# Patient Record
Sex: FEMALE | Race: WHITE | Employment: OTHER | URBAN - METROPOLITAN AREA
[De-identification: names, ages, dates, MRNs, and addresses within clinical notes are randomized per-mention and may not be internally consistent; named-entity substitution may affect disease eponyms.]

---

## 2019-07-02 ENCOUNTER — OFFICE VISIT (OUTPATIENT)
Dept: INTERNAL MEDICINE CLINIC | Age: 84
End: 2019-07-02
Payer: MEDICARE

## 2019-07-02 VITALS
OXYGEN SATURATION: 100 % | BODY MASS INDEX: 22.08 KG/M2 | DIASTOLIC BLOOD PRESSURE: 70 MMHG | TEMPERATURE: 97 F | SYSTOLIC BLOOD PRESSURE: 132 MMHG | WEIGHT: 120 LBS | HEIGHT: 62 IN | HEART RATE: 77 BPM

## 2019-07-02 DIAGNOSIS — E03.9 ACQUIRED HYPOTHYROIDISM: ICD-10-CM

## 2019-07-02 DIAGNOSIS — R53.81 DEBILITY: ICD-10-CM

## 2019-07-02 DIAGNOSIS — M40.04 POSTURAL KYPHOSIS OF THORACIC REGION: ICD-10-CM

## 2019-07-02 DIAGNOSIS — E78.00 HYPERCHOLESTEROLEMIA: ICD-10-CM

## 2019-07-02 DIAGNOSIS — I10 ESSENTIAL HYPERTENSION: ICD-10-CM

## 2019-07-02 DIAGNOSIS — H81.10 BENIGN PAROXYSMAL POSITIONAL VERTIGO, UNSPECIFIED LATERALITY: ICD-10-CM

## 2019-07-02 DIAGNOSIS — R54 FRAILTY SYNDROME IN GERIATRIC PATIENT: ICD-10-CM

## 2019-07-02 DIAGNOSIS — E55.9 VITAMIN D DEFICIENCY: Primary | ICD-10-CM

## 2019-07-02 DIAGNOSIS — N39.0 FREQUENT UTI: ICD-10-CM

## 2019-07-02 DIAGNOSIS — N18.2 CHRONIC KIDNEY DISEASE, STAGE II (MILD): ICD-10-CM

## 2019-07-02 PROBLEM — R55 VASOVAGAL SYNCOPE: Status: ACTIVE | Noted: 2017-02-08

## 2019-07-02 PROCEDURE — 99204 OFFICE O/P NEW MOD 45 MIN: CPT | Performed by: INTERNAL MEDICINE

## 2019-07-02 PROCEDURE — 1090F PRES/ABSN URINE INCON ASSESS: CPT | Performed by: INTERNAL MEDICINE

## 2019-07-02 PROCEDURE — 1036F TOBACCO NON-USER: CPT | Performed by: INTERNAL MEDICINE

## 2019-07-02 PROCEDURE — G8599 NO ASA/ANTIPLAT THER USE RNG: HCPCS | Performed by: INTERNAL MEDICINE

## 2019-07-02 PROCEDURE — 4040F PNEUMOC VAC/ADMIN/RCVD: CPT | Performed by: INTERNAL MEDICINE

## 2019-07-02 PROCEDURE — 1123F ACP DISCUSS/DSCN MKR DOCD: CPT | Performed by: INTERNAL MEDICINE

## 2019-07-02 PROCEDURE — G8427 DOCREV CUR MEDS BY ELIG CLIN: HCPCS | Performed by: INTERNAL MEDICINE

## 2019-07-02 PROCEDURE — G8420 CALC BMI NORM PARAMETERS: HCPCS | Performed by: INTERNAL MEDICINE

## 2019-07-02 RX ORDER — AMLODIPINE BESYLATE 5 MG/1
TABLET ORAL
COMMUNITY
Start: 2019-04-24 | End: 2020-01-03 | Stop reason: SDUPTHER

## 2019-07-02 RX ORDER — LEVOTHYROXINE SODIUM 0.03 MG/1
TABLET ORAL
COMMUNITY
Start: 2019-06-10 | End: 2020-04-07

## 2019-07-02 RX ORDER — MIRABEGRON 50 MG/1
TABLET, FILM COATED, EXTENDED RELEASE ORAL
COMMUNITY
Start: 2019-07-01 | End: 2019-07-02

## 2019-07-02 RX ORDER — MIRABEGRON 50 MG/1
50 TABLET, FILM COATED, EXTENDED RELEASE ORAL DAILY
Qty: 30 TABLET | Refills: 5 | Status: SHIPPED | OUTPATIENT
Start: 2019-07-02 | End: 2020-01-09

## 2019-07-02 RX ORDER — MECLIZINE HYDROCHLORIDE 25 MG/1
25 TABLET ORAL 3 TIMES DAILY PRN
Qty: 30 TABLET | Refills: 3 | Status: SHIPPED | OUTPATIENT
Start: 2019-07-02 | End: 2019-07-12

## 2019-07-02 ASSESSMENT — ENCOUNTER SYMPTOMS
RECTAL PAIN: 0
COLOR CHANGE: 0
PHOTOPHOBIA: 0
CHOKING: 0
APNEA: 0
RESPIRATORY NEGATIVE: 1
SHORTNESS OF BREATH: 0
FACIAL SWELLING: 0
TROUBLE SWALLOWING: 0
EYE ITCHING: 0
COUGH: 0
CHEST TIGHTNESS: 0
ANAL BLEEDING: 0
VOICE CHANGE: 0
BLOOD IN STOOL: 0
CONSTIPATION: 1
STRIDOR: 0
ABDOMINAL DISTENTION: 0
WHEEZING: 0
EYE REDNESS: 0
DIARRHEA: 1
ALLERGIC/IMMUNOLOGIC NEGATIVE: 1
ABDOMINAL PAIN: 1
EYES NEGATIVE: 1
RHINORRHEA: 0
SORE THROAT: 0
EYE PAIN: 0
BACK PAIN: 0
NAUSEA: 0
EYE DISCHARGE: 0
SINUS PRESSURE: 0
VOMITING: 0
SINUS PAIN: 0

## 2019-07-02 ASSESSMENT — PATIENT HEALTH QUESTIONNAIRE - PHQ9
SUM OF ALL RESPONSES TO PHQ QUESTIONS 1-9: 0
SUM OF ALL RESPONSES TO PHQ QUESTIONS 1-9: 0
1. LITTLE INTEREST OR PLEASURE IN DOING THINGS: 0
SUM OF ALL RESPONSES TO PHQ9 QUESTIONS 1 & 2: 0
2. FEELING DOWN, DEPRESSED OR HOPELESS: 0

## 2019-07-02 NOTE — PROGRESS NOTES
Psychiatric/Behavioral: Positive for sleep disturbance (insomnia). Negative for agitation, behavioral problems, confusion, decreased concentration, dysphoric mood, hallucinations, self-injury and suicidal ideas. The patient is nervous/anxious. The patient is not hyperactive.          Depression  Memory loss

## 2019-07-02 NOTE — PROGRESS NOTES
Chief Complaint   Patient presents with   [address]     NP - Fatigue - front of thighs hurt when standing up       HPI: Here to establish new PCP,  After Dr Hannah Velasquez retired then has been seeing visiting doctor at [facility], and for htn followup and management of multiple chronic conditions as per the active problems list below, which I reviewed and updated with the patient today. States doing well with no new concerns except if noted below. Lives at [facility] and saw Dr Lucas Haley there, but she stopped coming. I have reviewed the chart notes available from myself and other providers. I have addressed all activeproblems listed below, and created or updated the problems list as needed. I have reviewed and updated the problems list in detail with patient. Patient Active Problem List   Diagnosis    Benign paroxysmal positional vertigo    Chronic kidney disease, stage II (mild)    Coronary atherosclerosis    Hypercholesterolemia    Hypertension    Hypothyroid    Insomnia    Vasovagal syncope    Vitamin D deficiency    Old myocardial infarction    Depression    Frailty syndrome in geriatric patient    Debility    Frequent UTI       No problem-specific Assessment & Plan notes found for this encounter.       Discussed all labs, other tests, and imaging if available   Lab Results   Component Value Date    WBC 5.3 10/06/2016    HGB 14.1 10/06/2016    HCT 42.8 10/06/2016    MCV 86.8 10/06/2016     (L) 10/06/2016    LYMPHOPCT 23.4 10/06/2016    RBC 4.93 10/06/2016    MCH 28.7 10/06/2016    MCHC 33.0 10/06/2016    RDW 14.0 10/06/2016     Lab Results   Component Value Date     (L) 10/06/2016    K 4.2 10/06/2016    CL 95 (L) 10/06/2016    CO2 24 10/06/2016    BUN 15 10/06/2016    CREATININE 0.6 10/06/2016    GLUCOSE 119 (H) 10/06/2016    CALCIUM 9.0 10/06/2016    PROT 6.9 10/06/2016    LABALBU 4.2 10/06/2016    BILITOT 0.5 10/06/2016    ALKPHOS 81 10/06/2016    AST 24 Not on file   Relationships    Social connections:     Talks on phone: Not on file     Gets together: Not on file     Attends [employer] service: Not on file     Active member of club or organization: Not on file     Attends meetings of clubs or organizations: Not on file     Relationship status: Not on file    Intimate partner violence:     Fear of current or ex partner: Not on file     Emotionally abused: Not on file     Physically abused: Not on file     Forced sexual activity: Not on file   Other Topics Concern    Not on file   Social History Narrative    Not on file       History reviewed. No pertinent family history. Health Maintenance Due   Topic Date Due    TSH testing  03/18/1931    Shingles Vaccine (1 of 2) 03/18/1981    Annual Wellness Visit (AWV)  03/18/1994    Pneumococcal 65+ years Vaccine (2 of 2 - PCV13) 04/10/2015         /70 (Site: Left Upper Arm, Position: Sitting, Cuff Size: Medium Adult)   Pulse 77   Temp 97 °F (36.1 °C) (Oral)   Ht 5' 2\" (1.575 m)   Wt 120 lb (54.4 kg) Comment: Unable to stand on the scale  SpO2 100%   BMI 21.95 kg/m²   Body mass index is 21.95 kg/m². Physical Exam   Constitutional: She is oriented to person, place, and time. No distress. Frail, moves cautiously   HENT:   Head: Normocephalic and atraumatic. Nose: Nose normal.   Mouth/Throat: No oropharyngeal exudate. Eyes: Pupils are equal, round, and reactive to light. Conjunctivae and EOM are normal. Right eye exhibits no discharge. Left eye exhibits no discharge. No scleral icterus. Neck: Neck supple. No JVD present. No tracheal deviation present. No thyromegaly present. Cardiovascular: Normal rate, regular rhythm, normal heart sounds and intact distal pulses. Exam reveals no gallop and no friction rub. No murmur heard. Pulmonary/Chest: Effort normal and breath sounds normal. No respiratory distress. She has no wheezes. She has no rales. She exhibits no tenderness. Abdominal: Soft. Future    7. Chronic kidney disease, stage II (mild)  - COMPREHENSIVE METABOLIC PANEL; Future    8. Benign paroxysmal positional vertigo, unspecified laterality    9. Essential hypertension    10. Postural kyphosis of thoracic region  - DEXA BONE DENSITY 2 SITES; Future            Problem List     Benign paroxysmal positional vertigo    Chronic kidney disease, stage II (mild)    Relevant Orders    COMPREHENSIVE METABOLIC PANEL    Hypercholesterolemia    Relevant Medications    amLODIPine (NORVASC) 5 MG tablet    Other Relevant Orders    LIPID PANEL    Hypertension    Relevant Medications    amLODIPine (NORVASC) 5 MG tablet    Hypothyroid    Relevant Medications    levothyroxine (SYNTHROID) 25 MCG tablet    Other Relevant Orders    TSH WITH REFLEX TO FT4    Vitamin D deficiency - Primary    Relevant Orders    VITAMIN D 25 HYDROXY    Frailty syndrome in geriatric patient    Relevant Orders    DEXA BONE DENSITY 2 SITES    Debility    Relevant Orders    CBC WITH AUTO DIFFERENTIAL    DEXA BONE DENSITY 2 SITES    Frequent UTI        Orders Placed This Encounter   Procedures    DEXA BONE DENSITY 2 SITES     Standing Status:   Future     Standing Expiration Date:   7/2/2020    TSH WITH REFLEX TO FT4     Standing Status:   Future     Standing Expiration Date:   7/2/2020    VITAMIN D 25 HYDROXY     Standing Status:   Future     Standing Expiration Date:   7/2/2020    CBC WITH AUTO DIFFERENTIAL     Standing Status:   Future     Standing Expiration Date:   7/2/2020    COMPREHENSIVE METABOLIC PANEL     Standing Status:   Future     Standing Expiration Date:   7/2/2020    LIPID PANEL     Standing Status:   Future     Standing Expiration Date:   7/2/2020     Order Specific Question:   Is Patient Fasting?/# of Hours     Answer:   no       I have reconciled the medications in chart with what patient reports to be taking, andreviewed action/ sideeffects and how to take any new medications.   Patient/caregiver understands purpose and side effects. A complete  list of medications was provided in their after-visit summary. Return in about 6 months (around 1/2/2020) for Medicare Annual Wellness Visit (AWV), f/u [facility] extended. Time basedbilling: I spent over 45 minutes with this patient, and as is the nature of primary care and typical for my extended visits, over 50 percent of this visit was spent on counseling and coordination ofcare.

## 2019-08-14 ENCOUNTER — OFFICE VISIT (OUTPATIENT)
Dept: INTERNAL MEDICINE CLINIC | Age: 84
End: 2019-08-14
Payer: MEDICARE

## 2019-08-14 VITALS
OXYGEN SATURATION: 98 % | HEART RATE: 84 BPM | RESPIRATION RATE: 18 BRPM | BODY MASS INDEX: 21.95 KG/M2 | SYSTOLIC BLOOD PRESSURE: 123 MMHG | WEIGHT: 120 LBS | DIASTOLIC BLOOD PRESSURE: 72 MMHG

## 2019-08-14 DIAGNOSIS — R39.89 SUSPECTED UTI: Primary | ICD-10-CM

## 2019-08-14 PROCEDURE — G8427 DOCREV CUR MEDS BY ELIG CLIN: HCPCS | Performed by: NURSE PRACTITIONER

## 2019-08-14 PROCEDURE — 1036F TOBACCO NON-USER: CPT | Performed by: NURSE PRACTITIONER

## 2019-08-14 PROCEDURE — 1090F PRES/ABSN URINE INCON ASSESS: CPT | Performed by: NURSE PRACTITIONER

## 2019-08-14 PROCEDURE — 99213 OFFICE O/P EST LOW 20 MIN: CPT | Performed by: NURSE PRACTITIONER

## 2019-08-14 PROCEDURE — G8420 CALC BMI NORM PARAMETERS: HCPCS | Performed by: NURSE PRACTITIONER

## 2019-08-14 PROCEDURE — G8599 NO ASA/ANTIPLAT THER USE RNG: HCPCS | Performed by: NURSE PRACTITIONER

## 2019-08-14 PROCEDURE — 4040F PNEUMOC VAC/ADMIN/RCVD: CPT | Performed by: NURSE PRACTITIONER

## 2019-08-14 PROCEDURE — 1123F ACP DISCUSS/DSCN MKR DOCD: CPT | Performed by: NURSE PRACTITIONER

## 2019-08-14 ASSESSMENT — ENCOUNTER SYMPTOMS: COUGH: 0

## 2019-08-29 ENCOUNTER — HOSPITAL ENCOUNTER (OUTPATIENT)
Age: 84
Setting detail: OBSERVATION
Discharge: HOME | End: 2019-08-30
Attending: EMERGENCY MEDICINE | Admitting: INTERNAL MEDICINE
Payer: MEDICARE

## 2019-08-29 ENCOUNTER — APPOINTMENT (OUTPATIENT)
Dept: CT IMAGING | Age: 84
End: 2019-08-29
Payer: MEDICARE

## 2019-08-29 ENCOUNTER — APPOINTMENT (OUTPATIENT)
Dept: GENERAL RADIOLOGY | Age: 84
End: 2019-08-29
Payer: MEDICARE

## 2019-08-29 DIAGNOSIS — Z78.9 DECREASED INDEPENDENCE WITH ACTIVITIES OF DAILY LIVING: ICD-10-CM

## 2019-08-29 DIAGNOSIS — R29.898 RIGHT LEG WEAKNESS: Primary | ICD-10-CM

## 2019-08-29 DIAGNOSIS — M54.16 LUMBAR RADICULOPATHY: ICD-10-CM

## 2019-08-29 DIAGNOSIS — R26.2 AMBULATORY DYSFUNCTION: ICD-10-CM

## 2019-08-29 LAB
ANION GAP SERPL CALCULATED.3IONS-SCNC: 14 MMOL/L (ref 3–16)
BASOPHILS ABSOLUTE: 0.1 K/UL (ref 0–0.2)
BASOPHILS RELATIVE PERCENT: 2.4 %
BILIRUBIN URINE: NEGATIVE
BLOOD, URINE: NEGATIVE
BUN BLDV-MCNC: 16 MG/DL (ref 7–20)
CALCIUM SERPL-MCNC: 10 MG/DL (ref 8.3–10.6)
CHLORIDE BLD-SCNC: 97 MMOL/L (ref 99–110)
CLARITY: CLEAR
CO2: 25 MMOL/L (ref 21–32)
COLOR: YELLOW
COMMENT UA: ABNORMAL
CREAT SERPL-MCNC: 0.7 MG/DL (ref 0.6–1.2)
EKG ATRIAL RATE: 76 BPM
EKG DIAGNOSIS: NORMAL
EKG P AXIS: 59 DEGREES
EKG P-R INTERVAL: 220 MS
EKG Q-T INTERVAL: 376 MS
EKG QRS DURATION: 74 MS
EKG QTC CALCULATION (BAZETT): 423 MS
EKG R AXIS: -33 DEGREES
EKG T AXIS: 50 DEGREES
EKG VENTRICULAR RATE: 76 BPM
EOSINOPHILS ABSOLUTE: 0.1 K/UL (ref 0–0.6)
EOSINOPHILS RELATIVE PERCENT: 1.1 %
EPITHELIAL CELLS, UA: ABNORMAL /HPF
GFR AFRICAN AMERICAN: >60
GFR NON-AFRICAN AMERICAN: >60
GLUCOSE BLD-MCNC: 91 MG/DL (ref 70–99)
GLUCOSE URINE: NEGATIVE MG/DL
HCT VFR BLD CALC: 41.2 % (ref 36–48)
HEMOGLOBIN: 13.9 G/DL (ref 12–16)
KETONES, URINE: NEGATIVE MG/DL
LEUKOCYTE ESTERASE, URINE: ABNORMAL
LYMPHOCYTES ABSOLUTE: 0.9 K/UL (ref 1–5.1)
LYMPHOCYTES RELATIVE PERCENT: 17 %
MCH RBC QN AUTO: 29.6 PG (ref 26–34)
MCHC RBC AUTO-ENTMCNC: 33.6 G/DL (ref 31–36)
MCV RBC AUTO: 88.1 FL (ref 80–100)
MICROSCOPIC EXAMINATION: YES
MONOCYTES ABSOLUTE: 0.4 K/UL (ref 0–1.3)
MONOCYTES RELATIVE PERCENT: 7.9 %
NEUTROPHILS ABSOLUTE: 3.7 K/UL (ref 1.7–7.7)
NEUTROPHILS RELATIVE PERCENT: 71.6 %
NITRITE, URINE: NEGATIVE
PDW BLD-RTO: 14.4 % (ref 12.4–15.4)
PH UA: 8 (ref 5–8)
PLATELET # BLD: 129 K/UL (ref 135–450)
PMV BLD AUTO: 8.1 FL (ref 5–10.5)
POTASSIUM SERPL-SCNC: 4.3 MMOL/L (ref 3.5–5.1)
PROTEIN UA: NEGATIVE MG/DL
RBC # BLD: 4.68 M/UL (ref 4–5.2)
RBC UA: ABNORMAL /HPF (ref 0–2)
RENAL EPITHELIAL, UA: ABNORMAL /HPF
SODIUM BLD-SCNC: 136 MMOL/L (ref 136–145)
SPECIFIC GRAVITY UA: 1.01 (ref 1–1.03)
TSH REFLEX: 2.2 UIU/ML (ref 0.27–4.2)
URINE REFLEX TO CULTURE: YES
URINE TYPE: ABNORMAL
UROBILINOGEN, URINE: 0.2 E.U./DL
WBC # BLD: 5.2 K/UL (ref 4–11)
WBC UA: ABNORMAL /HPF (ref 0–5)

## 2019-08-29 PROCEDURE — 93005 ELECTROCARDIOGRAM TRACING: CPT | Performed by: PHYSICIAN ASSISTANT

## 2019-08-29 PROCEDURE — 97535 SELF CARE MNGMENT TRAINING: CPT

## 2019-08-29 PROCEDURE — 97530 THERAPEUTIC ACTIVITIES: CPT

## 2019-08-29 PROCEDURE — 97162 PT EVAL MOD COMPLEX 30 MIN: CPT

## 2019-08-29 PROCEDURE — 70450 CT HEAD/BRAIN W/O DYE: CPT

## 2019-08-29 PROCEDURE — 81001 URINALYSIS AUTO W/SCOPE: CPT

## 2019-08-29 PROCEDURE — 97116 GAIT TRAINING THERAPY: CPT

## 2019-08-29 PROCEDURE — 93010 ELECTROCARDIOGRAM REPORT: CPT | Performed by: INTERNAL MEDICINE

## 2019-08-29 PROCEDURE — 72131 CT LUMBAR SPINE W/O DYE: CPT

## 2019-08-29 PROCEDURE — 97166 OT EVAL MOD COMPLEX 45 MIN: CPT

## 2019-08-29 PROCEDURE — 99285 EMERGENCY DEPT VISIT HI MDM: CPT

## 2019-08-29 PROCEDURE — 73502 X-RAY EXAM HIP UNI 2-3 VIEWS: CPT

## 2019-08-29 PROCEDURE — 87086 URINE CULTURE/COLONY COUNT: CPT

## 2019-08-29 PROCEDURE — 85025 COMPLETE CBC W/AUTO DIFF WBC: CPT

## 2019-08-29 PROCEDURE — 80048 BASIC METABOLIC PNL TOTAL CA: CPT

## 2019-08-29 PROCEDURE — 2580000003 HC RX 258: Performed by: NURSE PRACTITIONER

## 2019-08-29 PROCEDURE — G0378 HOSPITAL OBSERVATION PER HR: HCPCS

## 2019-08-29 PROCEDURE — 84443 ASSAY THYROID STIM HORMONE: CPT

## 2019-08-29 PROCEDURE — 6370000000 HC RX 637 (ALT 250 FOR IP): Performed by: NURSE PRACTITIONER

## 2019-08-29 RX ORDER — DIPHENHYDRAMINE HCL 25 MG
25 TABLET ORAL ONCE
Status: COMPLETED | OUTPATIENT
Start: 2019-08-29 | End: 2019-08-29

## 2019-08-29 RX ORDER — HYDROCODONE BITARTRATE AND ACETAMINOPHEN 5; 325 MG/1; MG/1
1 TABLET ORAL EVERY 6 HOURS PRN
Status: DISCONTINUED | OUTPATIENT
Start: 2019-08-29 | End: 2019-08-30 | Stop reason: HOSPADM

## 2019-08-29 RX ORDER — AMLODIPINE BESYLATE 5 MG/1
5 TABLET ORAL DAILY
Status: DISCONTINUED | OUTPATIENT
Start: 2019-08-30 | End: 2019-08-30 | Stop reason: HOSPADM

## 2019-08-29 RX ORDER — LACTOBACILLUS RHAMNOSUS GG 10B CELL
1 CAPSULE ORAL DAILY
Status: DISCONTINUED | OUTPATIENT
Start: 2019-08-30 | End: 2019-08-30 | Stop reason: HOSPADM

## 2019-08-29 RX ORDER — SODIUM CHLORIDE 0.9 % (FLUSH) 0.9 %
10 SYRINGE (ML) INJECTION EVERY 12 HOURS SCHEDULED
Status: DISCONTINUED | OUTPATIENT
Start: 2019-08-29 | End: 2019-08-30 | Stop reason: HOSPADM

## 2019-08-29 RX ORDER — ACETAMINOPHEN 325 MG/1
650 TABLET ORAL EVERY 4 HOURS PRN
Status: DISCONTINUED | OUTPATIENT
Start: 2019-08-29 | End: 2019-08-30 | Stop reason: HOSPADM

## 2019-08-29 RX ORDER — ONDANSETRON 2 MG/ML
4 INJECTION INTRAMUSCULAR; INTRAVENOUS EVERY 6 HOURS PRN
Status: DISCONTINUED | OUTPATIENT
Start: 2019-08-29 | End: 2019-08-30 | Stop reason: HOSPADM

## 2019-08-29 RX ORDER — LEVOTHYROXINE SODIUM 0.03 MG/1
25 TABLET ORAL DAILY
Status: DISCONTINUED | OUTPATIENT
Start: 2019-08-30 | End: 2019-08-30 | Stop reason: HOSPADM

## 2019-08-29 RX ORDER — SODIUM CHLORIDE 0.9 % (FLUSH) 0.9 %
10 SYRINGE (ML) INJECTION PRN
Status: DISCONTINUED | OUTPATIENT
Start: 2019-08-29 | End: 2019-08-30 | Stop reason: HOSPADM

## 2019-08-29 RX ADMIN — HYDROCODONE BITARTRATE AND ACETAMINOPHEN 1 TABLET: 5; 325 TABLET ORAL at 22:11

## 2019-08-29 RX ADMIN — SODIUM CHLORIDE, PRESERVATIVE FREE 10 ML: 5 INJECTION INTRAVENOUS at 22:30

## 2019-08-29 RX ADMIN — DIPHENHYDRAMINE HCL 25 MG: 25 TABLET ORAL at 23:38

## 2019-08-29 ASSESSMENT — PAIN SCALES - GENERAL
PAINLEVEL_OUTOF10: 6
PAINLEVEL_OUTOF10: 3

## 2019-08-29 ASSESSMENT — PAIN DESCRIPTION - LOCATION
LOCATION: HIP

## 2019-08-29 ASSESSMENT — ENCOUNTER SYMPTOMS
ABDOMINAL PAIN: 0
BACK PAIN: 1
SORE THROAT: 0
VOMITING: 0
SHORTNESS OF BREATH: 0
NAUSEA: 0

## 2019-08-29 ASSESSMENT — PAIN DESCRIPTION - DESCRIPTORS
DESCRIPTORS: ACHING
DESCRIPTORS: ACHING

## 2019-08-29 ASSESSMENT — PAIN DESCRIPTION - PROGRESSION
CLINICAL_PROGRESSION: NOT CHANGED
CLINICAL_PROGRESSION: NOT CHANGED

## 2019-08-29 ASSESSMENT — PAIN DESCRIPTION - FREQUENCY
FREQUENCY: INTERMITTENT
FREQUENCY: INTERMITTENT

## 2019-08-29 ASSESSMENT — PAIN DESCRIPTION - ORIENTATION
ORIENTATION: RIGHT
ORIENTATION: RIGHT

## 2019-08-29 ASSESSMENT — PAIN DESCRIPTION - PAIN TYPE
TYPE: ACUTE PAIN
TYPE: ACUTE PAIN

## 2019-08-29 NOTE — PROGRESS NOTES
AROM : WFL  Right Hand AROM (degrees)  Right Hand AROM: WFL  LUE Strength  Gross LUE Strength: WFL  RUE Strength  Gross RUE Strength: WFL                   Plan   Plan  Times per week: 3-5  Times per day: Daily  Current Treatment Recommendations: Strengthening, Functional Mobility Training, Endurance Training, Balance Training, Neuromuscular Re-education, Self-Care / ADL, Safety Education & Training      AM-PAC Score        AM-PAC Inpatient Daily Activity Raw Score: 15 (08/29/19 1620)  AM-PAC Inpatient ADL T-Scale Score : 34.69 (08/29/19 1620)  ADL Inpatient CMS 0-100% Score: 56.46 (08/29/19 1620)  ADL Inpatient CMS G-Code Modifier : CK (08/29/19 1620)    Goals  Short term goals  Time Frame for Short term goals: Prior to d/c  Short term goal 1: Pt will perform bed mobility with supervision  Short term goal 2: Pt will perform functional transfers to/from ADL surfaces with supervision  Short term goal 3: Pt will toilet with min A  Short term goal 4: Pt will groom with setup  Short term goal 5: Pt will increase strength and endurance to achieve the above goals  Long term goals  Time Frame for Long term goals : LTG=STG  Patient Goals   Patient goals : To be able to walk       Therapy Time   Individual Concurrent Group Co-treatment   Time In 1515         Time Out 1600         Minutes 45         Timed Code Treatment Minutes: 30 Minutes(15 min eval)     This note to serve as OT d/c summary if pt is d/c-ed prior to next therapy session.     Katerina Valdez, OTR/L [id number]

## 2019-08-29 NOTE — PROGRESS NOTES
no loss of control of bowel or bladder, no recent falls. Denies any chest pain or shortness of breath. She states that she has been having increased frequency and urgency recently. The patient's son is here who states that he feels as though the patient cannot safely get around her apartment at independent living and feels she would benefit from placement within the nursing home. \"  Response To Previous Treatment: Not applicable  Referring Practitioner: Rakel Peoples PA-C  Referral Date : 08/29/19  Diagnosis: Ambulatory Dysfunction  Follows Commands: Within Functional Limits  Subjective  Subjective: Pt is very [place]. Agreeable to therapy evaluation. Pain Screening  Patient Currently in Pain: Denies    Orientation  Orientation  Overall Orientation Status: Within Functional Limits     Social/Functional History  Social/Functional History  Lives With: Alone  Type of Home: Apartment([facility])  Home Layout: One level  Home Access: Elevator  Bathroom Shower/Tub: Walk-in shower  Bathroom Toilet: Handicap height  Bathroom Equipment: Grab bars in shower  Home Equipment: Rolling walker(Transport chair)  ADL Assistance: Independent(Pt is independent with bathing, dressing, and self-care)  Homemaking Assistance: (Staff performs homemaking)  Ambulation Assistance: Independent(Pt is independent ambulating with RW in apartment)  Transfer Assistance: Independent  Active : No  Additional Comments: Pt denies hx of recent falls.     Objective    AROM RLE (degrees)  RLE AROM: WFL  RLE General AROM: Hip Flex, Knee Flex/Ext, and Ankle PF/DF WFL  AROM LLE (degrees)  LLE AROM : WFL  LLE General AROM: Hip Flex, Knee Flex/Ext, and Ankle PF/DF [facility]     Strength RLE  Strength RLE: Exception  Comment: Hip Flex 3/5, Knee Ext 3+/5, Ankle DF 3/5  Strength LLE  Strength LLE: Exception  Comment: Hip Flex 3/5, Knee Ext 3+/5, Ankle DF 3/5     Tone RLE  RLE Tone: Normotonic  Tone LLE  LLE Tone: Normotonic  Motor Control  Gross Motor?: with RW, CGA  Long term goals  Time Frame for Long term goals : LTG = STG  Patient Goals   Patient goals : To return to [facility]   Time In       1515   Time Out       1600   Minutes       45   Timed Code Treatment Minutes: 30 Minutes   Evaluation time: 15 min  .      Quique Victoria PT    Electronically signed by Quique Victoria PT [id number] on 8/29/2019 at 4:18 PM

## 2019-08-30 VITALS
OXYGEN SATURATION: 97 % | HEART RATE: 84 BPM | RESPIRATION RATE: 18 BRPM | WEIGHT: 123.24 LBS | HEIGHT: 62 IN | TEMPERATURE: 95.4 F | SYSTOLIC BLOOD PRESSURE: 159 MMHG | DIASTOLIC BLOOD PRESSURE: 78 MMHG | BODY MASS INDEX: 22.68 KG/M2

## 2019-08-30 LAB
ANION GAP SERPL CALCULATED.3IONS-SCNC: 13 MMOL/L (ref 3–16)
BASOPHILS ABSOLUTE: 0 K/UL (ref 0–0.2)
BASOPHILS RELATIVE PERCENT: 0.9 %
BUN BLDV-MCNC: 17 MG/DL (ref 7–20)
CALCIUM SERPL-MCNC: 9.1 MG/DL (ref 8.3–10.6)
CHLORIDE BLD-SCNC: 103 MMOL/L (ref 99–110)
CO2: 23 MMOL/L (ref 21–32)
CREAT SERPL-MCNC: 0.7 MG/DL (ref 0.6–1.2)
EOSINOPHILS ABSOLUTE: 0.1 K/UL (ref 0–0.6)
EOSINOPHILS RELATIVE PERCENT: 1.8 %
GFR AFRICAN AMERICAN: >60
GFR NON-AFRICAN AMERICAN: >60
GLUCOSE BLD-MCNC: 97 MG/DL (ref 70–99)
HCT VFR BLD CALC: 41.8 % (ref 36–48)
HEMOGLOBIN: 13.9 G/DL (ref 12–16)
LYMPHOCYTES ABSOLUTE: 1.2 K/UL (ref 1–5.1)
LYMPHOCYTES RELATIVE PERCENT: 25.3 %
MCH RBC QN AUTO: 29.5 PG (ref 26–34)
MCHC RBC AUTO-ENTMCNC: 33.2 G/DL (ref 31–36)
MCV RBC AUTO: 88.8 FL (ref 80–100)
MONOCYTES ABSOLUTE: 0.6 K/UL (ref 0–1.3)
MONOCYTES RELATIVE PERCENT: 11.8 %
NEUTROPHILS ABSOLUTE: 2.8 K/UL (ref 1.7–7.7)
NEUTROPHILS RELATIVE PERCENT: 60.2 %
PDW BLD-RTO: 14.5 % (ref 12.4–15.4)
PLATELET # BLD: 135 K/UL (ref 135–450)
PMV BLD AUTO: 8 FL (ref 5–10.5)
POTASSIUM REFLEX MAGNESIUM: 4.1 MMOL/L (ref 3.5–5.1)
RBC # BLD: 4.71 M/UL (ref 4–5.2)
SODIUM BLD-SCNC: 139 MMOL/L (ref 136–145)
URINE CULTURE, ROUTINE: NORMAL
WBC # BLD: 4.7 K/UL (ref 4–11)

## 2019-08-30 PROCEDURE — 85025 COMPLETE CBC W/AUTO DIFF WBC: CPT

## 2019-08-30 PROCEDURE — 36415 COLL VENOUS BLD VENIPUNCTURE: CPT

## 2019-08-30 PROCEDURE — 6370000000 HC RX 637 (ALT 250 FOR IP): Performed by: INTERNAL MEDICINE

## 2019-08-30 PROCEDURE — 6370000000 HC RX 637 (ALT 250 FOR IP): Performed by: NURSE PRACTITIONER

## 2019-08-30 PROCEDURE — 6360000002 HC RX W HCPCS: Performed by: NURSE PRACTITIONER

## 2019-08-30 PROCEDURE — 96372 THER/PROPH/DIAG INJ SC/IM: CPT

## 2019-08-30 PROCEDURE — 2580000003 HC RX 258: Performed by: NURSE PRACTITIONER

## 2019-08-30 PROCEDURE — G0378 HOSPITAL OBSERVATION PER HR: HCPCS

## 2019-08-30 PROCEDURE — 80048 BASIC METABOLIC PNL TOTAL CA: CPT

## 2019-08-30 PROCEDURE — 94760 N-INVAS EAR/PLS OXIMETRY 1: CPT

## 2019-08-30 PROCEDURE — 6360000002 HC RX W HCPCS: Performed by: INTERNAL MEDICINE

## 2019-08-30 RX ORDER — POLYETHYLENE GLYCOL 3350 17 G/17G
17 POWDER, FOR SOLUTION ORAL DAILY
Status: DISCONTINUED | OUTPATIENT
Start: 2019-08-30 | End: 2019-08-30 | Stop reason: HOSPADM

## 2019-08-30 RX ORDER — HYDROCODONE BITARTRATE AND ACETAMINOPHEN 5; 325 MG/1; MG/1
1 TABLET ORAL EVERY 6 HOURS PRN
Qty: 12 TABLET | Refills: 0 | Status: SHIPPED | OUTPATIENT
Start: 2019-08-30 | End: 2019-09-02

## 2019-08-30 RX ORDER — DEXAMETHASONE 4 MG/1
4 TABLET ORAL DAILY
Status: DISCONTINUED | OUTPATIENT
Start: 2019-08-30 | End: 2019-08-30 | Stop reason: HOSPADM

## 2019-08-30 RX ORDER — DOCUSATE SODIUM 100 MG/1
100 CAPSULE, LIQUID FILLED ORAL 2 TIMES DAILY
Status: DISCONTINUED | OUTPATIENT
Start: 2019-08-30 | End: 2019-08-30 | Stop reason: HOSPADM

## 2019-08-30 RX ADMIN — DEXAMETHASONE 4 MG: 4 TABLET ORAL at 10:08

## 2019-08-30 RX ADMIN — SODIUM CHLORIDE, PRESERVATIVE FREE 10 ML: 5 INJECTION INTRAVENOUS at 08:43

## 2019-08-30 RX ADMIN — MAGNESIUM HYDROXIDE 30 ML: 400 SUSPENSION ORAL at 08:46

## 2019-08-30 RX ADMIN — LEVOTHYROXINE SODIUM 25 MCG: 25 TABLET ORAL at 05:18

## 2019-08-30 RX ADMIN — AMLODIPINE BESYLATE 5 MG: 5 TABLET ORAL at 08:42

## 2019-08-30 RX ADMIN — DOCUSATE SODIUM 100 MG: 100 CAPSULE, LIQUID FILLED ORAL at 10:08

## 2019-08-30 RX ADMIN — Medication 1 CAPSULE: at 08:42

## 2019-08-30 RX ADMIN — ENOXAPARIN SODIUM 40 MG: 40 INJECTION SUBCUTANEOUS at 08:42

## 2019-08-30 ASSESSMENT — PAIN SCALES - GENERAL
PAINLEVEL_OUTOF10: 0
PAINLEVEL_OUTOF10: 0

## 2019-08-30 NOTE — PROGRESS NOTES
Patient alert and oriented times four. Patient complaining of that starts in her right hip and runs down to her right knee. Fall risk assessment completed. 4 eyes assessment completed. Fall precautions in place. Call light within reach. Pt educated on calling for assistance before getting up. Walkway free of clutter. Will continue to monitor.

## 2019-08-30 NOTE — DISCHARGE SUMMARY
with chronic back pain and right radiculopathy. CT is positive for neural foraminal narrowing at the L5-S1. Moderate degenerative changes noted. -Patient was evaluated in the emergency room by physical therapy. It was determined the patient was unsafe to return to  [address] living.  -Neurosurgery consult: follow up in outpatient for options  -OT PT consult: SNF  -Social service consult for discharge planning  -Pain management  -Neurovascular checks     Inability to ambulate/cannot walk  Suspect secondary to above. 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 Normally is able to ambulate with a walker however now has increased shuffling worse for the past 2 weeks  -PT consult: SNF  -May benefit from an MRI Of the lumbar spine in the morning  -Patient and family concerned she may have had a stroke however explain to them that we are 2 weeks outside of the  Event.     Hypertension  - continue home meds and monitor blood pressure    Patient was placed in observation and brought to the hospital.  Patient was seen by physical therapy and Occupational Therapy who both recommended rehab stay. She will return to 20 hours to a rehab bed and set of independent living. Neurosurgery saw the patient for her radicular lumbar pain and stated she can follow-up in the outpatient for possible options. She will be go to rehab with  pain medication. Exam:     BP (!) 159/78   Pulse 84   Temp 95.4 °F (35.2 °C) (Oral)   Resp 18   Ht 5' 2\" (1.575 m)   Wt 123 lb 3.8 oz (55.9 kg)   SpO2 97%   BMI 22.54 kg/m²     General appearance: No apparent distress, appears stated age and cooperative. HEENT: Pupils equal, round, and reactive to light. Conjunctivae/corneas clear. Neck: Supple, with full range of motion. No jugular venous distention. Trachea midline. Respiratory:  Normal respiratory effort. Clear to auscultation, bilaterally without Rales/Wheezes/Rhonchi.   Cardiovascular: Regular rate and Medication List           Details   HYDROcodone-acetaminophen (NORCO) 5-325 MG per tablet Take 1 tablet by mouth every 6 hours as needed for Pain for up to 3 days. Qty: 12 tablet, Refills: 0    Comments: Reduce doses taken as pain becomes manageable  Associated Diagnoses: Lumbar radiculopathy              Details   Psyllium (METAMUCIL) 28.3 % POWD Take 1 each by mouth as needed      amLODIPine (NORVASC) 5 MG tablet       levothyroxine (SYNTHROID) 25 MCG tablet       Probiotic Product (PROBIOTIC-10 PO) Take by mouth      MYRBETRIQ 50 MG TB24 Take 50 mg by mouth daily  Qty: 30 tablet, Refills: 5             Future Appointments   Date Time Provider Aureliano Bustos   1/9/2020  2:00 PM SCHEDULE, [facility] LPN [facility]   1/9/2020  3:00 PM Cherri Sánchez MD [facility]       Time Spent on discharge is more than 30 minutes in the examination, evaluation, counseling and review of medications and discharge plan. Signed:    Angélica Silva MD   8/30/2019      Thank you Romero Larkin MD for the opportunity to be involved in this patient's care. If you have any questions or concerns please feel free to contact me at [phone].

## 2019-08-30 NOTE — CARE COORDINATION
REQUEST FOR SKILLED FACILITY AUTHORIZATION SUBMITTED TO [employer]:    Patient name:  Kurtis York    Current Location: [facility]    Admit date to hospital: OBSERVATION ON 8/29/2019    Requested Setting:  [facility] SKILLED UNIT    Anticipated Admit Date to SNF Level of Care: 8/30/2019      ORDERING MD: DR. Dulce Sheppard    NPI NUMBER:  [id number]      Electronically signed by Denisa Martinez on 8/30/2019 at 9:57 AM

## 2019-08-30 NOTE — PROGRESS NOTES
Per Dr Loulou Sánchez (who called back for ortho sx consult), we need to consult neurosx team for lumbar radiculopathy as has to do with spine. Consult placed for them.   Electronically signed by Shawn Li RN on 8/30/2019 at 7:48 AM

## 2019-08-30 NOTE — H&P
without obvious deformity. Extra ocular muscles intact. Conjunctivae/corneas clear. Neck: Supple, with full range of motion. No jugular venous distention. Trachea midline. Respiratory:  Normal respiratory effort. Clear to auscultation, bilaterally without Rales/Wheezes/Rhonchi. Cardiovascular:  Regular rate and rhythm without murmurs, rubs or gallops. Abdomen: Soft, non-tender, non-distended, without rebound or guarding. Normal bowel sounds. Musculoskeletal:  No clubbing, cyanosis or edema bilaterally. Full range of motion without deformity. Positive tenderness to the right lower lumbar region and into the right PSIS region. Positive straight leg raise on the right. Strength is equal bilaterally to the lower extremity. Skin: Skin color, texture, turgor normal.  No rashes or lesions. Neurologic:  Neurovascularly intact without any focal sensory/motor deficits. Cranial nerves: II-XII intact, grossly non-focal.  Psychiatric:  Alert and oriented, thought content appropriate, normal insight  Capillary Refill: Brisk,< 3 seconds   Peripheral Pulses: +2 palpable, equal bilaterally       Labs:     Recent Labs     08/29/19  1433   WBC 5.2   HGB 13.9   HCT 41.2   *     Recent Labs     08/29/19  1433      K 4.3   CL 97*   CO2 25   BUN 16   CREATININE 0.7   CALCIUM 10.0     No results for input(s): AST, ALT, BILIDIR, BILITOT, ALKPHOS in the last 72 hours. No results for input(s): INR in the last 72 hours. No results for input(s): Bienvenido Ocasio in the last 72 hours.     Urinalysis:      Lab Results   Component Value Date    NITRU Negative 08/29/2019    WBCUA 0-2 08/29/2019    RBCUA None seen 08/29/2019    BLOODU Negative 08/29/2019    SPECGRAV 1.007 08/29/2019    GLUCOSEU Negative 08/29/2019       Radiology:     CXR: I have reviewed the CXR with the following interpretation:   EKG:  I have reviewed the EKG with the following interpretation:     CT HEAD WO CONTRAST   Final Result   No acute intracranial you Rodolfo Luke MD for the opportunity to be involved in this patient's care. If you have any questions or concerns please feel free to contact me at [phone].

## 2019-08-31 NOTE — CONSULTS
[address] [address]                                  CONSULTATION    PATIENT NAME: Tesfaye Macias                       :        1931  MED REC NO:   [id number]                          ROOM:       4754  ACCOUNT NO:   [de-identified]                           ADMIT DATE: 2019  PROVIDER:     Samia Puentes MD    CONSULT DATE:  2019    REASON FOR CONSULTATION:  Back pain, leg weakness. HISTORY OF PRESENT ILLNESS:  The patient is an 55-year-old who is a  resident of [address] [place] [address] and a longstanding history of  difficulty with ambulation requiring a use of a walker for years and  more recently, a transfer chair. She presented to the emergency  department with complaints of gait problem, right low back pain. She  has developed intermittent positional radiating pain down the right leg  and subjective weakness. She had trouble getting into the bathroom but  does not report feeling weak. The back pain has been a more recent  development. She denies any numbness of the extremities. There is no  change in bowel or bladder. She has been having frequent urgency. She  was evaluated in the emergency department with a CAT scan. There was a  concern of a possible cerebral ischemic sequela and she was admitted for  observation. CT scan of the lumbar spine was performed and it is  available for my review. PAST MEDICAL HISTORY:  Coronary artery disease, cerebral artery  occlusion and stroke. PAST SURGICAL HISTORY:  No lumbar surgery. MEDICATIONS:  Amlodipine, Synthroid, Myrbetriq, and probiotic. ALLERGIES:  SULFA and TRAZODONE. FAMILY HISTORY:  Lives in an assisted living, nondrinker, nonsmoker. REVIEW OF SYSTEMS:  CONSTITUTIONAL:  Negative for fever or chills. GI:   Negative for nausea, vomiting, diarrhea, constipation. :  Negative  for dysuria.   CARDIAC:  Negative for

## 2019-09-03 ENCOUNTER — TELEPHONE (OUTPATIENT)
Dept: INTERNAL MEDICINE CLINIC | Age: 84
End: 2019-09-03

## 2019-09-03 NOTE — TELEPHONE ENCOUNTER
[facility] Transitions Initial Follow Up Call    Outreach made within 2 business days of discharge: Yes    Patient: Rubia Kelly Patient : 3/18/1931   MRN: [id number]  Reason for Admission: There are no discharge diagnoses documented for the most recent discharge. Discharge Date: 19       Spoke with: voicemail    Discharge department/facility: [facility]    TCM Interactive Patient Contact:  Was patient able to fill all prescriptions: unable to speak to patient. Was patient instructed to bring all medications to the follow-up visit:unable to speak to patient. Is patient taking all medications as directed in the discharge summary? unable to speak to patient. Does patient understand their discharge instructions: unable to speak to patient. Does patient have questions or concerns that need addressed prior to 7-14 day follow up office visit: unable to speak to patient.     Scheduled appointment with PCP within 7-14 days    Follow Up  Future Appointments   Date Time Provider Aureliano Bustos   2020  2:00 PM SCHEDULE, Kristal [facility] JAY   2020  3:00 PM MD [facility] PIERRE Portillo LPN

## 2019-09-13 ENCOUNTER — TELEPHONE (OUTPATIENT)
Dept: INTERNAL MEDICINE CLINIC | Age: 84
End: 2019-09-13

## 2019-09-18 ENCOUNTER — TELEPHONE (OUTPATIENT)
Dept: INTERNAL MEDICINE CLINIC | Age: 84
End: 2019-09-18

## 2019-09-18 NOTE — TELEPHONE ENCOUNTER
Calling to get order for speech therapy eval due to swallowing problems and eating problems.  Gave HHN ok to get eval.

## 2019-09-26 ENCOUNTER — OFFICE VISIT (OUTPATIENT)
Dept: INTERNAL MEDICINE CLINIC | Age: 84
End: 2019-09-26
Payer: MEDICARE

## 2019-09-26 VITALS
OXYGEN SATURATION: 98 % | RESPIRATION RATE: 16 BRPM | SYSTOLIC BLOOD PRESSURE: 124 MMHG | DIASTOLIC BLOOD PRESSURE: 77 MMHG | HEART RATE: 82 BPM | TEMPERATURE: 97.7 F

## 2019-09-26 DIAGNOSIS — M54.16 LUMBAR RADICULOPATHY: ICD-10-CM

## 2019-09-26 DIAGNOSIS — R54 FRAILTY SYNDROME IN GERIATRIC PATIENT: ICD-10-CM

## 2019-09-26 DIAGNOSIS — N39.0 FREQUENT UTI: ICD-10-CM

## 2019-09-26 DIAGNOSIS — Z09 ENCOUNTER FOR EXAMINATION FOLLOWING TREATMENT AT HOSPITAL: Primary | ICD-10-CM

## 2019-09-26 PROCEDURE — G8420 CALC BMI NORM PARAMETERS: HCPCS | Performed by: INTERNAL MEDICINE

## 2019-09-26 PROCEDURE — 99215 OFFICE O/P EST HI 40 MIN: CPT | Performed by: INTERNAL MEDICINE

## 2019-09-26 PROCEDURE — G8427 DOCREV CUR MEDS BY ELIG CLIN: HCPCS | Performed by: INTERNAL MEDICINE

## 2019-09-26 PROCEDURE — 1090F PRES/ABSN URINE INCON ASSESS: CPT | Performed by: INTERNAL MEDICINE

## 2019-09-26 PROCEDURE — 4040F PNEUMOC VAC/ADMIN/RCVD: CPT | Performed by: INTERNAL MEDICINE

## 2019-09-26 PROCEDURE — 1123F ACP DISCUSS/DSCN MKR DOCD: CPT | Performed by: INTERNAL MEDICINE

## 2019-09-26 PROCEDURE — 1036F TOBACCO NON-USER: CPT | Performed by: INTERNAL MEDICINE

## 2019-09-26 PROCEDURE — G8598 ASA/ANTIPLAT THER USED: HCPCS | Performed by: INTERNAL MEDICINE

## 2019-09-26 PROCEDURE — 1111F DSCHRG MED/CURRENT MED MERGE: CPT | Performed by: INTERNAL MEDICINE

## 2020-01-03 RX ORDER — AMLODIPINE BESYLATE 5 MG/1
5 TABLET ORAL DAILY
Qty: 30 TABLET | Refills: 11 | Status: SHIPPED | OUTPATIENT
Start: 2020-01-03

## 2020-01-03 NOTE — TELEPHONE ENCOUNTER
Requested Prescriptions     Pending Prescriptions Disp Refills    amLODIPine (NORVASC) 5 MG tablet 30 tablet          Pharmacy:   [facility] [phone] - F [phone] (Phone)  [phone] (Fax)       Next office visit: 01.09.20  Last regular office visit: 09.26.19

## 2020-01-09 ENCOUNTER — OFFICE VISIT (OUTPATIENT)
Dept: INTERNAL MEDICINE CLINIC | Age: 85
End: 2020-01-09
Payer: MEDICARE

## 2020-01-09 VITALS
HEART RATE: 75 BPM | OXYGEN SATURATION: 100 % | TEMPERATURE: 97.2 F | SYSTOLIC BLOOD PRESSURE: 135 MMHG | HEIGHT: 62 IN | WEIGHT: 122.2 LBS | BODY MASS INDEX: 22.49 KG/M2 | RESPIRATION RATE: 14 BRPM | DIASTOLIC BLOOD PRESSURE: 78 MMHG

## 2020-01-09 PROBLEM — R26.2 CANNOT WALK: Status: RESOLVED | Noted: 2019-08-29 | Resolved: 2020-01-09

## 2020-01-09 PROBLEM — R32 URINARY INCONTINENCE: Status: ACTIVE | Noted: 2020-01-09

## 2020-01-09 PROCEDURE — 1090F PRES/ABSN URINE INCON ASSESS: CPT | Performed by: INTERNAL MEDICINE

## 2020-01-09 PROCEDURE — G8482 FLU IMMUNIZE ORDER/ADMIN: HCPCS | Performed by: INTERNAL MEDICINE

## 2020-01-09 PROCEDURE — 1036F TOBACCO NON-USER: CPT | Performed by: INTERNAL MEDICINE

## 2020-01-09 PROCEDURE — 1123F ACP DISCUSS/DSCN MKR DOCD: CPT | Performed by: INTERNAL MEDICINE

## 2020-01-09 PROCEDURE — 99214 OFFICE O/P EST MOD 30 MIN: CPT | Performed by: INTERNAL MEDICINE

## 2020-01-09 PROCEDURE — G8427 DOCREV CUR MEDS BY ELIG CLIN: HCPCS | Performed by: INTERNAL MEDICINE

## 2020-01-09 PROCEDURE — 0509F URINE INCON PLAN DOCD: CPT | Performed by: INTERNAL MEDICINE

## 2020-01-09 PROCEDURE — 4040F PNEUMOC VAC/ADMIN/RCVD: CPT | Performed by: INTERNAL MEDICINE

## 2020-01-09 PROCEDURE — G8420 CALC BMI NORM PARAMETERS: HCPCS | Performed by: INTERNAL MEDICINE

## 2020-01-09 ASSESSMENT — ENCOUNTER SYMPTOMS
BLOOD IN STOOL: 0
EYE REDNESS: 0
WHEEZING: 0
DIARRHEA: 0
SHORTNESS OF BREATH: 0
NAUSEA: 0
BACK PAIN: 0
EYE PAIN: 0
SORE THROAT: 0
PHOTOPHOBIA: 0
STRIDOR: 0
COUGH: 0
EYE DISCHARGE: 0
VOMITING: 0
ABDOMINAL PAIN: 0
CONSTIPATION: 1

## 2020-01-09 NOTE — PROGRESS NOTES
No chief complaint on file. HPI: Here for htn followup and management of multiple chronic conditions as per the active problems list below, which I reviewed and updated with the patient today. States doing well with no new concerns except if noted below. Is walking more, using walker, up and down halls at [facility]. C/o some confusion, mostly in the morning, unchanged. She thinks her bladder medicine isn't helping. Mybetriq? She isn't sure. I have reviewed the chart notes available from myself and other providers. I have addressed all activeproblems listed below, and created or updated the problems list as needed. I have reviewed and updated the problems list in detail with patient. Patient Active Problem List   Diagnosis    Benign paroxysmal positional vertigo    Chronic kidney disease, stage II (mild)    Coronary atherosclerosis    Hypertension    Hypothyroid    Insomnia    Vasovagal syncope    Vitamin D deficiency    Old myocardial infarction    Depression    Frailty syndrome in geriatric patient    Debility    Frequent UTI    Lumbar radiculopathy    Urinary incontinence       No problem-specific Assessment & Plan notes found for this encounter.       Discussed all labs, other tests, and imaging if available   Lab Results   Component Value Date    WBC 4.7 08/30/2019    HGB 13.9 08/30/2019    HCT 41.8 08/30/2019    MCV 88.8 08/30/2019     08/30/2019    LYMPHOPCT 25.3 08/30/2019    RBC 4.71 08/30/2019    MCH 29.5 08/30/2019    MCHC 33.2 08/30/2019    RDW 14.5 08/30/2019     Lab Results   Component Value Date     08/30/2019    K 4.1 08/30/2019     08/30/2019    CO2 23 08/30/2019    BUN 17 08/30/2019    CREATININE 0.7 08/30/2019    GLUCOSE 97 08/30/2019    CALCIUM 9.1 08/30/2019    PROT 6.9 10/06/2016    LABALBU 4.2 10/06/2016    BILITOT 0.5 10/06/2016    ALKPHOS 81 10/06/2016    AST 24 10/06/2016    ALT <5 (L) 10/06/2016    LABGLOM >60 08/30/2019    GFRAA >60 08/30/2019    AGRATIO 1.6 10/06/2016    GLOB 2.7 10/06/2016     No results found for: TRIG  No results found for: HDL  No results found for: LDLCALC, LDLCHOLESTEROL  No results found for: PSA, PSADIA  No results found for: TSHFT4, TSH  No results found for: LABA1C  No results found for: EAG    I have extensively reviewed and reconciled the medication list, discontinued medications not taking or no longer appropriate, and updated the active meds list    Prior to Visit Medications    Medication Sig Taking? Authorizing Provider   amLODIPine (NORVASC) 5 MG tablet Take 1 tablet by mouth daily Yes Consuelo Waters MD   Psyllium (METAMUCIL) 28.3 % POWD Take 1 each by mouth as needed Yes Historical Provider, MD   levothyroxine (SYNTHROID) 25 MCG tablet  Yes Historical Provider, MD   Probiotic Product (PROBIOTIC-10 PO) Take by mouth Yes Historical Provider, MD   MYRBETRIQ 50 MG TB24 Take 50 mg by mouth daily  Patient not taking: Reported on 1/9/2020  Consuelo Waters MD          ROS: 12 systems reviewed, as documented by MA    Past Medical History:   Diagnosis Date    CAD (coronary artery disease)     Cerebral artery occlusion with cerebral infarction [facility])        History reviewed. No pertinent surgical history. Social History     Socioeconomic History    Marital status:       Spouse name: Not on file    Number of children: Not on file    Years of education: Not on file    Highest education level: Not on file   Occupational History    Not on file   Social Needs    Financial resource strain: Not on file    Food insecurity:     Worry: Not on file     Inability: Not on file    Transportation needs:     Medical: Not on file     Non-medical: Not on file   Tobacco Use    Smoking status: Never Smoker    Smokeless tobacco: Never Used   Substance and Sexual Activity    Alcohol use: No    Drug use: No    Sexual activity: Never   Lifestyle    Physical activity:     Days per week: Not on file     Minutes per session: Not on file    Stress: Not on file   Relationships    Social connections:     Talks on phone: Not on file     Gets together: Not on file     Attends Episcopal service: Not on file     Active member of club or organization: Not on file     Attends meetings of clubs or organizations: Not on file     Relationship status: Not on file    Intimate partner violence:     Fear of current or ex partner: Not on file     Emotionally abused: Not on file     Physically abused: Not on file     Forced sexual activity: Not on file   Other Topics Concern    Not on file   Social History Narrative    Not on file       History reviewed. No pertinent family history. Health Maintenance Due   Topic Date Due    Shingles Vaccine (1 of 2) 03/18/1981    Annual Wellness Visit (AWV)  06/23/2019         /78 (Site: Left Upper Arm, Position: Sitting, Cuff Size: Medium Adult)   Pulse 75   Temp 97.2 °F (36.2 °C) (Oral)   Resp 14   Ht 5' 2\" (1.575 m)   Wt 122 lb 3.2 oz (55.4 kg)   SpO2 100%   BMI 22.35 kg/m²   Body mass index is 22.35 kg/m². Physical Exam    ASSESSMENT AND PLANS:      Except as noted below, all chronic problems have been reviewed and are stable to continue medications or other therapy as previously documented in the patient's chart, with changes per orders or documentation below:        Assessment and Plan: Patient received counseling and, if relevant,printed instructions for all symptoms listed in CC and HPI, as well as for all diagnoses brought onto today's visit note below. Typical counseling includes, but is not limited to, non pharmacologic measures to manage listed symptoms and conditions; appropriate use, risks and benefits for all prescribed medications; potential interactions between medications both prescribed and OTC; diet; exercise; healthy behaviors; and goalsetting to improve health.  Pt.or responsible party was involved in shared decision making and had opportunity to have all

## 2020-04-07 RX ORDER — LEVOTHYROXINE SODIUM 0.03 MG/1
TABLET ORAL
Qty: 90 TABLET | Refills: 0 | Status: SHIPPED | OUTPATIENT
Start: 2020-04-07 | End: 2020-09-03

## 2020-04-07 NOTE — TELEPHONE ENCOUNTER
Requested Prescriptions     Pending Prescriptions Disp Refills    levothyroxine (SYNTHROID) 25 MCG tablet [Pharmacy Med Name: LEVOTHYROXINE SODIUM 25 MCG Tablet] 90 tablet      Sig: TAKE 1 TABLET EVERY DAY     Patient requesting a medication refill.       Pharmacy:   [address] [place] [address] [phone] - F [phone] (Phone)  [phone] (Fax)       Next office visit: 07.14.20  Last regular office visit:01.09.20

## 2020-07-02 ENCOUNTER — VIRTUAL VISIT (OUTPATIENT)
Dept: INTERNAL MEDICINE CLINIC | Age: 85
End: 2020-07-02
Payer: MEDICARE

## 2020-07-02 ENCOUNTER — TELEPHONE (OUTPATIENT)
Dept: INTERNAL MEDICINE CLINIC | Age: 85
End: 2020-07-02

## 2020-07-02 PROCEDURE — 99213 OFFICE O/P EST LOW 20 MIN: CPT | Performed by: INTERNAL MEDICINE

## 2020-07-02 NOTE — PROGRESS NOTES
Chief Complaint   Patient presents with    URI       HPI: Virtual visit via doxy. me during covid-19 pandemic for c/o bad cold symptoms. States feeling better this afternoon but had severe clear rhinitis this morning. No fever, change in chronic cough, fatigue. [place] [facility] [place] has been isolated in her room with little to no chance of Covid exposure. Medications reviewed and reconciled with what patient reports to be taking. There were no vitals taken for this visit. Physical Exam sounds well, no sniffles or cough during our conversation    ASSESSMENT/PLAN: Pt received counseling and, if relevant, printed instructions for all symptoms listed in CC and HPI, as well as for all diagnoses listed below. 1. Rhinitis, unspecified type--now resolved. Counseled to monitor temp and will need to be checked forCovid if sx worsen, but no treatment needed at this time. Problem List Items Addressed This Visit     None      Visit Diagnoses     Rhinitis, unspecified type    -  Primary            No follow-ups on file. Kristy Cunningham is a 80 y.o. female evaluated via telephone on 7/2/2020. Consent:  She and/or health care decision maker is aware that that she may receive a bill for this telephone service, depending on her insurance coverage, and has provided verbal consent to proceed: Yes      I affirm this is a Patient Initiated Episode with a Patient who has not had a related appointment within my department in the past 7 days or scheduled within the next 24 hours.     Patient identification was verified at the start of the visit: Yes    Total Time: minutes: 11-20 minutes    Manuel Christine

## 2020-07-02 NOTE — TELEPHONE ENCOUNTER
Not sure what this is about--if facility is testing all residents that would not involve an order from me. . May need VV to address.

## 2020-07-16 ENCOUNTER — TELEPHONE (OUTPATIENT)
Dept: INTERNAL MEDICINE CLINIC | Age: 85
End: 2020-07-16

## 2020-07-16 NOTE — TELEPHONE ENCOUNTER
Spoke with patient. Gave her the message from the Dr. She understood and will call [place] [facility]

## 2020-07-23 ENCOUNTER — TELEPHONE (OUTPATIENT)
Dept: INTERNAL MEDICINE CLINIC | Age: 85
End: 2020-07-23

## 2020-07-23 NOTE — TELEPHONE ENCOUNTER
Spoke with patient. She stated that a nurse pulled her out of line from getting the COVID testing. She stated the [employer] is there at [facility] to do the testing. She wanted Dr. Brianne Morales to order the test.  Dr. Brianne Morales stated she doesn't feel its her place to order the test because patient lives in a [address] [place] [address] Drive. If they want their patients' tested the  On staff should order the test.     I stated to the patient: she might need to talk to a  to find out what's going on, told her to ask Nurse to talk to a . She stated the nurse has no clue what's going on either.

## 2020-09-02 ENCOUNTER — TELEPHONE (OUTPATIENT)
Dept: INTERNAL MEDICINE CLINIC | Age: 85
End: 2020-09-02

## 2020-09-02 RX ORDER — NITROFURANTOIN 25; 75 MG/1; MG/1
100 CAPSULE ORAL 2 TIMES DAILY
Qty: 20 CAPSULE | Refills: 0 | Status: SHIPPED | OUTPATIENT
Start: 2020-09-02 | End: 2020-09-12

## 2020-09-03 RX ORDER — LEVOTHYROXINE SODIUM 0.03 MG/1
TABLET ORAL
Qty: 90 TABLET | Refills: 0 | Status: SHIPPED | OUTPATIENT
Start: 2020-09-03

## 2020-09-03 NOTE — TELEPHONE ENCOUNTER
Requested Prescriptions     Pending Prescriptions Disp Refills    levothyroxine (SYNTHROID) 25 MCG tablet [Pharmacy Med Name: LEVOTHYROXINE SODIUM 25 MCG Tablet] 90 tablet 0     Sig: TAKE 1 TABLET EVERY DAY   Patient requesting a medication refill.   Last filled on: 4/8/20  Pharmacy: [facility]  Next office visit: Visit date not found  Last regular office visit: 7/2/2020

## 2020-12-05 ENCOUNTER — HOSPITAL ENCOUNTER (INPATIENT)
Age: 85
LOS: 2 days | Discharge: NURSING, REHAB OR LONG-TERM CARE | DRG: 065 | End: 2020-12-09
Attending: EMERGENCY MEDICINE | Admitting: INTERNAL MEDICINE
Payer: MEDICARE

## 2020-12-05 ENCOUNTER — APPOINTMENT (OUTPATIENT)
Dept: CT IMAGING | Age: 85
DRG: 065 | End: 2020-12-05
Payer: MEDICARE

## 2020-12-05 ENCOUNTER — APPOINTMENT (OUTPATIENT)
Dept: GENERAL RADIOLOGY | Age: 85
DRG: 065 | End: 2020-12-05
Payer: MEDICARE
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
## 2020-12-05 LAB
A/G RATIO: 1.6 (ref 1.1–2.2)
ALBUMIN SERPL-MCNC: 4.6 G/DL (ref 3.4–5)
ALP BLD-CCNC: 72 U/L (ref 40–129)
ALT SERPL-CCNC: 16 U/L (ref 10–40)
ANION GAP SERPL CALCULATED.3IONS-SCNC: 10 MMOL/L (ref 3–16)
AST SERPL-CCNC: 24 U/L (ref 15–37)
BASOPHILS ABSOLUTE: 0 K/UL (ref 0–0.2)
BASOPHILS RELATIVE PERCENT: 0.4 %
BILIRUB SERPL-MCNC: 0.4 MG/DL (ref 0–1)
BILIRUBIN URINE: NEGATIVE
BLOOD, URINE: NEGATIVE
BUN BLDV-MCNC: 17 MG/DL (ref 7–20)
CALCIUM SERPL-MCNC: 9.4 MG/DL (ref 8.3–10.6)
CHLORIDE BLD-SCNC: 99 MMOL/L (ref 99–110)
CLARITY: CLEAR
CO2: 26 MMOL/L (ref 21–32)
COLOR: YELLOW
CREAT SERPL-MCNC: 0.8 MG/DL (ref 0.6–1.2)
EOSINOPHILS ABSOLUTE: 0.1 K/UL (ref 0–0.6)
EOSINOPHILS RELATIVE PERCENT: 1.5 %
EPITHELIAL CELLS, UA: 1 /HPF (ref 0–5)
GFR AFRICAN AMERICAN: >60
GFR NON-AFRICAN AMERICAN: >60
GLOBULIN: 2.8 G/DL
GLUCOSE BLD-MCNC: 98 MG/DL (ref 70–99)
GLUCOSE URINE: NEGATIVE MG/DL
HCT VFR BLD CALC: 40.7 % (ref 36–48)
HEMOGLOBIN: 13.6 G/DL (ref 12–16)
HYALINE CASTS: 1 /LPF (ref 0–8)
KETONES, URINE: NEGATIVE MG/DL
LEUKOCYTE ESTERASE, URINE: ABNORMAL
LYMPHOCYTES ABSOLUTE: 0.7 K/UL (ref 1–5.1)
LYMPHOCYTES RELATIVE PERCENT: 13.7 %
MCH RBC QN AUTO: 29.1 PG (ref 26–34)
MCHC RBC AUTO-ENTMCNC: 33.5 G/DL (ref 31–36)
MCV RBC AUTO: 87 FL (ref 80–100)
MICROSCOPIC EXAMINATION: YES
MONOCYTES ABSOLUTE: 0.5 K/UL (ref 0–1.3)
MONOCYTES RELATIVE PERCENT: 8.4 %
NEUTROPHILS ABSOLUTE: 4.1 K/UL (ref 1.7–7.7)
NEUTROPHILS RELATIVE PERCENT: 76 %
NITRITE, URINE: NEGATIVE
PDW BLD-RTO: 14.5 % (ref 12.4–15.4)
PH UA: 8 (ref 5–8)
PLATELET # BLD: 123 K/UL (ref 135–450)
PMV BLD AUTO: 9.3 FL (ref 5–10.5)
POTASSIUM REFLEX MAGNESIUM: 4.1 MMOL/L (ref 3.5–5.1)
PROTEIN UA: NEGATIVE MG/DL
RBC # BLD: 4.68 M/UL (ref 4–5.2)
RBC UA: 1 /HPF (ref 0–4)
SARS-COV-2, NAAT: NOT DETECTED
SODIUM BLD-SCNC: 135 MMOL/L (ref 136–145)
SPECIFIC GRAVITY UA: 1.01 (ref 1–1.03)
TOTAL PROTEIN: 7.4 G/DL (ref 6.4–8.2)
TROPONIN: <0.01 NG/ML
URINE REFLEX TO CULTURE: ABNORMAL
URINE TYPE: ABNORMAL
UROBILINOGEN, URINE: 0.2 E.U./DL
WBC # BLD: 5.4 K/UL (ref 4–11)
WBC UA: 1 /HPF (ref 0–5)

## 2020-12-05 PROCEDURE — 6370000000 HC RX 637 (ALT 250 FOR IP): Performed by: INTERNAL MEDICINE

## 2020-12-05 PROCEDURE — 93005 ELECTROCARDIOGRAM TRACING: CPT

## 2020-12-05 PROCEDURE — U0002 COVID-19 LAB TEST NON-CDC: HCPCS

## 2020-12-05 PROCEDURE — 84484 ASSAY OF TROPONIN QUANT: CPT

## 2020-12-05 PROCEDURE — 96372 THER/PROPH/DIAG INJ SC/IM: CPT

## 2020-12-05 PROCEDURE — G0378 HOSPITAL OBSERVATION PER HR: HCPCS

## 2020-12-05 PROCEDURE — 85025 COMPLETE CBC W/AUTO DIFF WBC: CPT

## 2020-12-05 PROCEDURE — 6360000002 HC RX W HCPCS: Performed by: INTERNAL MEDICINE

## 2020-12-05 PROCEDURE — 6370000000 HC RX 637 (ALT 250 FOR IP): Performed by: NURSE PRACTITIONER

## 2020-12-05 PROCEDURE — 80053 COMPREHEN METABOLIC PANEL: CPT

## 2020-12-05 PROCEDURE — 72131 CT LUMBAR SPINE W/O DYE: CPT

## 2020-12-05 PROCEDURE — 72125 CT NECK SPINE W/O DYE: CPT

## 2020-12-05 PROCEDURE — 99284 EMERGENCY DEPT VISIT MOD MDM: CPT

## 2020-12-05 PROCEDURE — 70450 CT HEAD/BRAIN W/O DYE: CPT

## 2020-12-05 PROCEDURE — 81001 URINALYSIS AUTO W/SCOPE: CPT

## 2020-12-05 PROCEDURE — 2580000003 HC RX 258: Performed by: INTERNAL MEDICINE

## 2020-12-05 PROCEDURE — 71045 X-RAY EXAM CHEST 1 VIEW: CPT

## 2020-12-05 RX ORDER — LACTOBACILLUS RHAMNOSUS GG 10B CELL
1 CAPSULE ORAL
Status: DISCONTINUED | OUTPATIENT
Start: 2020-12-06 | End: 2020-12-09 | Stop reason: HOSPADM

## 2020-12-05 RX ORDER — SODIUM CHLORIDE 0.9 % (FLUSH) 0.9 %
10 SYRINGE (ML) INJECTION PRN
Status: DISCONTINUED | OUTPATIENT
Start: 2020-12-05 | End: 2020-12-07

## 2020-12-05 RX ORDER — AMLODIPINE BESYLATE 5 MG/1
5 TABLET ORAL DAILY
Status: DISCONTINUED | OUTPATIENT
Start: 2020-12-06 | End: 2020-12-09 | Stop reason: HOSPADM

## 2020-12-05 RX ORDER — MAGNESIUM SULFATE IN WATER 40 MG/ML
2 INJECTION, SOLUTION INTRAVENOUS PRN
Status: DISCONTINUED | OUTPATIENT
Start: 2020-12-05 | End: 2020-12-09 | Stop reason: HOSPADM

## 2020-12-05 RX ORDER — SODIUM CHLORIDE 0.9 % (FLUSH) 0.9 %
10 SYRINGE (ML) INJECTION EVERY 12 HOURS SCHEDULED
Status: DISCONTINUED | OUTPATIENT
Start: 2020-12-05 | End: 2020-12-07

## 2020-12-05 RX ORDER — ACETAMINOPHEN 325 MG/1
650 TABLET ORAL EVERY 6 HOURS PRN
Status: DISCONTINUED | OUTPATIENT
Start: 2020-12-05 | End: 2020-12-09 | Stop reason: HOSPADM

## 2020-12-05 RX ORDER — POTASSIUM CHLORIDE 7.45 MG/ML
10 INJECTION INTRAVENOUS PRN
Status: DISCONTINUED | OUTPATIENT
Start: 2020-12-05 | End: 2020-12-09 | Stop reason: HOSPADM

## 2020-12-05 RX ORDER — ACETAMINOPHEN 650 MG/1
650 SUPPOSITORY RECTAL EVERY 6 HOURS PRN
Status: DISCONTINUED | OUTPATIENT
Start: 2020-12-05 | End: 2020-12-09 | Stop reason: HOSPADM

## 2020-12-05 RX ORDER — ONDANSETRON 2 MG/ML
4 INJECTION INTRAMUSCULAR; INTRAVENOUS EVERY 6 HOURS PRN
Status: DISCONTINUED | OUTPATIENT
Start: 2020-12-05 | End: 2020-12-07

## 2020-12-05 RX ORDER — LANOLIN ALCOHOL/MO/W.PET/CERES
6 CREAM (GRAM) TOPICAL NIGHTLY PRN
Status: DISCONTINUED | OUTPATIENT
Start: 2020-12-05 | End: 2020-12-09 | Stop reason: HOSPADM

## 2020-12-05 RX ORDER — PROMETHAZINE HYDROCHLORIDE 25 MG/1
12.5 TABLET ORAL EVERY 6 HOURS PRN
Status: DISCONTINUED | OUTPATIENT
Start: 2020-12-05 | End: 2020-12-07

## 2020-12-05 RX ORDER — LEVOTHYROXINE SODIUM 0.03 MG/1
25 TABLET ORAL
Status: DISCONTINUED | OUTPATIENT
Start: 2020-12-06 | End: 2020-12-09 | Stop reason: HOSPADM

## 2020-12-05 RX ADMIN — ENOXAPARIN SODIUM 40 MG: 40 INJECTION SUBCUTANEOUS at 20:43

## 2020-12-05 RX ADMIN — Medication 10 ML: at 20:43

## 2020-12-05 RX ADMIN — Medication 6 MG: at 20:43

## 2020-12-05 RX ADMIN — ACETAMINOPHEN 650 MG: 325 TABLET ORAL at 20:43

## 2020-12-05 ASSESSMENT — PAIN SCALES - GENERAL
PAINLEVEL_OUTOF10: 3
PAINLEVEL_OUTOF10: 0
PAINLEVEL_OUTOF10: 6

## 2020-12-05 ASSESSMENT — PAIN DESCRIPTION - LOCATION: LOCATION: BACK;NECK

## 2020-12-05 NOTE — PROGRESS NOTES
Medication Reconciliation    List of medications patient is currently taking is complete. Source of information: 1. Conversation with patient at bedside                                      2. EPIC records      Notes regarding home medications:   1. Patient received all of her home medications prior to arrival to the emergency department. 2. Patient reports not regularly taking probiotic and taking Metamucil PRN. 3. Patient denies any other OTC or herbal medication use.     Jayme Sandoval, Pharmacy Intern  12/5/2020 4:00 PM

## 2020-12-05 NOTE — ED PROVIDER NOTES
Emergency Physician Note    Chief Complaint  Fall and Back Pain       History of Present Illness  Nayeli Francis is a 80 y.o. female who presents to the ED for fall and back pain. Patient reports that she stood up from her desk and went to turn and instead fell backwards onto her back. She states she did strike her head on the way down but denies loss of consciousness. She complains currently of neck pain and low back pain. She denies any other pain complaints. No chest pain or shortness of breath. Patient is hard of hearing. She currently resides at [facility]. 10 systems reviewed, pertinent positives per HPI otherwise noted to be negative    I have reviewed the following from the nursing documentation:      Prior to Admission medications    Medication Sig Start Date End Date Taking? Authorizing Provider   levothyroxine (SYNTHROID) 25 MCG tablet TAKE 1 TABLET EVERY DAY 9/3/20   Amari Garcia MD   amLODIPine (NORVASC) 5 MG tablet Take 1 tablet by mouth daily 1/3/20   Amari Garcia MD   Psyllium (METAMUCIL) 28.3 % POWD Take 1 each by mouth as needed    Historical Provider, MD   Probiotic Product (PROBIOTIC-10 PO) Take by mouth    Historical Provider, MD       Allergies as of 12/05/2020 - Review Complete 12/05/2020   Allergen Reaction Noted    Sulfa antibiotics  10/06/2016    Trazodone  07/02/2012    Sulfamethoxazole-trimethoprim Nausea Only and Rash 07/23/2010       Past Medical History:   Diagnosis Date    CAD (coronary artery disease)     Cerebral artery occlusion with cerebral infarction [facility])         Surgical History: No past surgical history on file. Family History:  No family history on file. Social History     Socioeconomic History    Marital status:       Spouse name: Not on file    Number of children: Not on file    Years of education: Not on file    Highest education level: Not on file   Occupational History    Not on file   Social Needs    Financial resource strain: Not on file    Food insecurity     Worry: Not on file     Inability: Not on file    Transportation needs     Medical: Not on file     Non-medical: Not on file   Tobacco Use    Smoking status: Never Smoker    Smokeless tobacco: Never Used   Substance and Sexual Activity    Alcohol use: No    Drug use: No    Sexual activity: Never   Lifestyle    Physical activity     Days per week: Not on file     Minutes per session: Not on file    Stress: Not on file   Relationships    Social connections     Talks on phone: Not on file     Gets together: Not on file     Attends Sabianist service: Not on file     Active member of club or organization: Not on file     Attends meetings of clubs or organizations: Not on file     Relationship status: Not on file    Intimate partner violence     Fear of current or ex partner: Not on file     Emotionally abused: Not on file     Physically abused: Not on file     Forced sexual activity: Not on file   Other Topics Concern    Not on file   Social History Narrative    Not on file       Nursing notes reviewed. ED Triage Vitals [12/05/20 1241]   Enc Vitals Group      BP (!) 162/83      Pulse 79      Resp 14      Temp 97.8 °F (36.6 °C)      Temp src       SpO2 99 %      Weight 121 lb 11.1 oz (55.2 kg)      Height       Head Circumference       Peak Flow       Pain Score       Pain Loc       Pain Edu? Excl. in [address]? GENERAL:  Awake, alert. Well developed, well nourished with no apparent distress. HENT:  Normocephalic, Atraumatic, moist mucous membranes. EYES:  Pupils equal round and reactive to light, Conjunctiva normal, extraocular movements normal.  NECK:  No meningeal signs, Supple. Tender to palpation in the lower midline cervical spine. CHEST:  Regular rate and rhythm, chest wall non-tender. LUNGS:  Clear to auscultation bilaterally. ABDOMEN:  Soft, non-tender, no rebound, rigidity or guarding, non-distended, normal bowel sounds. No costovertebral angle tenderness to palpation. BACK: Tender in the midline lumbar spine. No step-offs, contusions or abrasions throughout the cervical, thoracic or lumbar spine. EXTREMITIES:  Normal range of motion, no edema, no bony tenderness, no deformity, distal pulses present. Remainder of axial and appendicular skeleton NT exc as noted. Full active ROM as well at all jts exc as noted. SKIN: Warm, dry and intact. NEUROLOGIC: Patient is oriented to person place and time. Moving all extremities to command. LABS and DIAGNOSTIC RESULTS  EKG  The Ekg interpreted by me shows  normal sinus rhythm with a rate of 81  Axis is   Normal  QTc is  normal  Intervals and Durations are unremarkable. ST Segments: normal  Delta waves, Brugada Syndrome, and Short [place] are not present. No significant change from prior EKG dated 8/29/19    RADIOLOGY  X-RAYS:  I have reviewed radiologic plain film image(s). ALL OTHER NON-PLAIN FILM IMAGES SUCH AS CT, ULTRASOUND AND MRI HAVE BEEN READ BY THE RADIOLOGIST. XR CHEST PORTABLE   Final Result   No active cardiopulmonary disease         CT Head WO Contrast   Final Result   No acute intracranial abnormality. Diffuse atrophic changes with findings suggesting chronic microvascular   ischemia         CT Cervical Spine WO Contrast   Final Result   No acute abnormality of the cervical spine. CT Lumbar Spine WO Contrast   Final Result   1. No acute lumbar fracture. 2. Grade 1 degenerative anterolisthesis L4 on L5.   3. Bones appear osteoporotic. 4. Mild-to-moderate degenerative changes of the lumbar spine commensurate   with the patient's age. There is resultant mild lumbar spinal canal stenosis   L4-5. 5. Prominent calcific atherosclerosis distal aorta with narrowing of the   lumen, incompletely characterized without IV contrast enhancement.               LABS  Labs Reviewed   CBC WITH AUTO DIFFERENTIAL - Abnormal; Notable for the following components: Result Value    Platelets 643 (*)     Lymphocytes Absolute 0.7 (*)     All other components within normal limits    Narrative:     Performed at:  [address] [place] [address]   Phone [phone]   COMPREHENSIVE METABOLIC PANEL W/ REFLEX TO MG FOR LOW K - Abnormal; Notable for the following components:    Sodium 135 (*)     All other components within normal limits    Narrative:     Performed at:  [address] [place] [address]   Phone [phone]   URINE RT REFLEX TO CULTURE - Abnormal; Notable for the following components:    Leukocyte Esterase, Urine TRACE (*)     All other components within normal limits    Narrative:     Performed at:  [address] [place] [address]   Phone [phone]   MICROSCOPIC URINALYSIS    Narrative:     Performed at:  [address] [place] [address]   Phone [phone]    Narrative:     ORDER WAS CANCELLED 12/05/2020 16:00, Cancelled: In-house testing. Performed at:  [address] [facility] [place] [address]   Phone [phone]   TROPONIN    Narrative:     Performed at:  [facility]  [address] [place] [address] [place] [address]   Phone [phone]       MEDICAL DECISION MAKING     On my repeat evaluation I attempted to help the patient stand up and she was unable to stand because of generalized weakness. She states she feels too weak and lightheaded to stand. She states that the [facility] she lives has a nursing facility that she can be transferred to. We called that facility and they advised that if the patient is kept under observation tonight and has a PT and OT evaluation they can move her to the skilled facility tomorrow.     I spoke with  Mahin. We thoroughly discussed the history, physical exam, laboratory and imaging studies, as well as, emergency department course. Based upon that discussion, we've decided to admit [facility] Ack[facility] for further observation and evaluation of J Luis Rangel. As I have deemed necessary from their history, physical, and studies, I have considered and evaluated Santos Ackerman for the following diagnoses:        FINAL IMPRESSION  1. Fall, initial encounter    2. Neck pain    3. Low back pain without sciatica, unspecified back pain laterality, unspecified chronicity    4. General weakness        Vitals:  Blood pressure (!) 178/74, pulse 99, temperature 97.8 °F (36.6 °C), resp. rate 20, weight 121 lb 11.1 oz (55.2 kg), SpO2 96 %, not currently breastfeeding. Disposition  Pt is in stable condition upon Admit to med/surg floor. This chart was generated using the [employer] dictation system. I created this record but it may contain dictation errors.           Larry Adame MD  12/05/20 5785

## 2020-12-05 NOTE — ED NOTES
Bed: E-46  Expected date: 12/5/20  Expected time:   Means of arrival:   Comments:  EMS - 79 yo fall, back pain     Mandy Johns RN  12/05/20 4171

## 2020-12-05 NOTE — ED NOTES
Patient was wanting to go home and not stay in the hospital.  [place] [facility] This RN called and spoke to Mal Nunes, the admissions director at  and she said for patient to get PT and OT eval and they can get the admission tomorrow. Patient updated and is agreeable.        Jayden Randolph RN  12/05/20 4493

## 2020-12-05 NOTE — H&P
Hospital Medicine History & Physical      PCP: Kwasi Sky MD    Date of Admission: 12/5/2020    Chief Complaint: Fall    History Of Present Illness:  Patient is a 69-year-old female with past medical history of CAD, hypertension, hypothyroidism who presents to the hospital after a fall at group home. According to the patient she was trying to take down and she fell to the ground. Patient did not feel lightheaded or dizzy before the event patient mentions she thinks like her legs have been weak and she has difficulty even sitting up. Patient denies any new numbness tingling. Patient endorses hitting the back of her head on the ground. Patient endorses generalized weakness difficulty ambulation. Patient denies chest pain shortness of breath nausea vomiting diarrhea constipation fevers chills. Past Medical History:          Diagnosis Date    CAD (coronary artery disease)     Cerebral artery occlusion with cerebral infarction [facility])        Past Surgical History:      No past surgical history on file. Medications Prior to Admission:      Prior to Admission medications    Medication Sig Start Date End Date Taking? Authorizing Provider   levothyroxine (SYNTHROID) 25 MCG tablet TAKE 1 TABLET EVERY DAY 9/3/20  Yes Nilda Pham MD   amLODIPine (NORVASC) 5 MG tablet Take 1 tablet by mouth daily 1/3/20  Yes Nilda Pham MD   Psyllium (METAMUCIL) 28.3 % POWD Take 1 each by mouth as needed   Yes Historical Provider, MD   Probiotic Product (PROBIOTIC-10 PO) Take 1 capsule by mouth daily as needed    Yes Historical Provider, MD       Allergies:  Sulfa antibiotics; Trazodone; and Sulfamethoxazole-trimethoprim    Social History:      TOBACCO:   reports that she has never smoked. She has never used smokeless tobacco.  ETOH:   reports no history of alcohol use. Family History:       Reviewed in detail and non contributory      No family history on file.     REVIEW OF SYSTEMS:   Pertinent positives as noted in the HPI. All other systems reviewed and negative. PHYSICAL EXAM PERFORMED:    BP (!) 161/89   Pulse 94   Temp 97.8 °F (36.6 °C) (Oral)   Resp 16   Wt 121 lb 11.1 oz (55.2 kg)   SpO2 98%   BMI 22.26 kg/m²     General appearance:  No apparent distress, cooperative. HEENT:  Normal cephalic, atraumatic without obvious deformity. Conjunctivae/corneas clear. Neck: Supple, with full range of motion. No cervical lymphadenopathy  Respiratory:  Normal respiratory effort. Clear to auscultation, bilaterally without Rales/Wheezes/Rhonchi. Cardiovascular:  Regular rate and rhythm with normal S1/S2 without murmurs, rubs or gallops. Abdomen: Soft, non-tender, non-distended, normal bowel sounds. Musculoskeletal:  No edema noted bilaterally. No tenderness on palpation   Skin: no rash visible  Neurologic:  Neurologically intact without any focal sensory/motor deficits. grossly non-focal.  Psychiatric:  Alert and oriented, normal mood  Peripheral Pulses: +2 palpable, equal bilaterally       Labs:     Recent Labs     12/05/20  1324   WBC 5.4   HGB 13.6   HCT 40.7   *     Recent Labs     12/05/20  1324   *   K 4.1   CL 99   CO2 26   BUN 17   CREATININE 0.8   CALCIUM 9.4     Recent Labs     12/05/20  1324   AST 24   ALT 16   BILITOT 0.4   ALKPHOS 72     No results for input(s): INR in the last 72 hours. Recent Labs     12/05/20  1324   TROPONINI <0.01       Urinalysis:      Lab Results   Component Value Date    NITRU Negative 12/05/2020    WBCUA 1 12/05/2020    RBCUA 1 12/05/2020    BLOODU Negative 12/05/2020    SPECGRAV 1.007 12/05/2020    GLUCOSEU Negative 12/05/2020       Radiology:       XR CHEST PORTABLE   Final Result   No active cardiopulmonary disease         CT Head WO Contrast   Final Result   No acute intracranial abnormality.       Diffuse atrophic changes with findings suggesting chronic microvascular   ischemia         CT Cervical Spine WO Contrast   Final Result   No acute care. If you have any questions or concerns please feel free to contact me at [phone].     Elyse Ken MD

## 2020-12-05 NOTE — ED NOTES
[facility] admissions, [phone]      Algis Boxer, RN  12/05/20 9847

## 2020-12-05 NOTE — ED TRIAGE NOTES
Patient arrived via squad from her independent living apartment after a fall when turning too quickly. She states pain in her neck and back, denies hitting her head, denies LOC.

## 2020-12-05 NOTE — ED TRIAGE NOTES

## 2020-12-06 LAB
ANION GAP SERPL CALCULATED.3IONS-SCNC: 12 MMOL/L (ref 3–16)
BUN BLDV-MCNC: 12 MG/DL (ref 7–20)
CALCIUM SERPL-MCNC: 9.1 MG/DL (ref 8.3–10.6)
CHLORIDE BLD-SCNC: 100 MMOL/L (ref 99–110)
CO2: 23 MMOL/L (ref 21–32)
CREAT SERPL-MCNC: 0.6 MG/DL (ref 0.6–1.2)
EKG ATRIAL RATE: 81 BPM
EKG DIAGNOSIS: NORMAL
EKG P AXIS: 57 DEGREES
EKG P-R INTERVAL: 190 MS
EKG Q-T INTERVAL: 386 MS
EKG QRS DURATION: 68 MS
EKG QTC CALCULATION (BAZETT): 448 MS
EKG R AXIS: -24 DEGREES
EKG T AXIS: 70 DEGREES
EKG VENTRICULAR RATE: 81 BPM
GFR AFRICAN AMERICAN: >60
GFR NON-AFRICAN AMERICAN: >60
GLUCOSE BLD-MCNC: 108 MG/DL (ref 70–99)
HCT VFR BLD CALC: 43.3 % (ref 36–48)
HEMOGLOBIN: 14.4 G/DL (ref 12–16)
MCH RBC QN AUTO: 28.6 PG (ref 26–34)
MCHC RBC AUTO-ENTMCNC: 33.2 G/DL (ref 31–36)
MCV RBC AUTO: 86.1 FL (ref 80–100)
PDW BLD-RTO: 14.9 % (ref 12.4–15.4)
PLATELET # BLD: 134 K/UL (ref 135–450)
PMV BLD AUTO: 8.8 FL (ref 5–10.5)
POTASSIUM REFLEX MAGNESIUM: 3.6 MMOL/L (ref 3.5–5.1)
RBC # BLD: 5.03 M/UL (ref 4–5.2)
SODIUM BLD-SCNC: 135 MMOL/L (ref 136–145)
WBC # BLD: 6 K/UL (ref 4–11)

## 2020-12-06 PROCEDURE — 6360000002 HC RX W HCPCS: Performed by: INTERNAL MEDICINE

## 2020-12-06 PROCEDURE — 36415 COLL VENOUS BLD VENIPUNCTURE: CPT

## 2020-12-06 PROCEDURE — 96374 THER/PROPH/DIAG INJ IV PUSH: CPT

## 2020-12-06 PROCEDURE — 97162 PT EVAL MOD COMPLEX 30 MIN: CPT

## 2020-12-06 PROCEDURE — 80048 BASIC METABOLIC PNL TOTAL CA: CPT

## 2020-12-06 PROCEDURE — 2580000003 HC RX 258: Performed by: INTERNAL MEDICINE

## 2020-12-06 PROCEDURE — 96372 THER/PROPH/DIAG INJ SC/IM: CPT

## 2020-12-06 PROCEDURE — G0378 HOSPITAL OBSERVATION PER HR: HCPCS

## 2020-12-06 PROCEDURE — 6370000000 HC RX 637 (ALT 250 FOR IP): Performed by: INTERNAL MEDICINE

## 2020-12-06 PROCEDURE — 6370000000 HC RX 637 (ALT 250 FOR IP): Performed by: NURSE PRACTITIONER

## 2020-12-06 PROCEDURE — 85027 COMPLETE CBC AUTOMATED: CPT

## 2020-12-06 PROCEDURE — 97530 THERAPEUTIC ACTIVITIES: CPT

## 2020-12-06 RX ADMIN — Medication 1 CAPSULE: at 09:07

## 2020-12-06 RX ADMIN — ACETAMINOPHEN 650 MG: 325 TABLET ORAL at 20:29

## 2020-12-06 RX ADMIN — ONDANSETRON 4 MG: 2 INJECTION INTRAMUSCULAR; INTRAVENOUS at 12:20

## 2020-12-06 RX ADMIN — ACETAMINOPHEN 650 MG: 325 TABLET ORAL at 02:40

## 2020-12-06 RX ADMIN — Medication 10 ML: at 12:19

## 2020-12-06 RX ADMIN — Medication 6 MG: at 20:29

## 2020-12-06 RX ADMIN — AMLODIPINE BESYLATE 5 MG: 5 TABLET ORAL at 09:12

## 2020-12-06 RX ADMIN — Medication 10 ML: at 12:24

## 2020-12-06 RX ADMIN — Medication 10 ML: at 09:06

## 2020-12-06 RX ADMIN — LEVOTHYROXINE SODIUM 25 MCG: 0.03 TABLET ORAL at 05:32

## 2020-12-06 RX ADMIN — PROMETHAZINE HYDROCHLORIDE 12.5 MG: 25 TABLET ORAL at 20:35

## 2020-12-06 RX ADMIN — ENOXAPARIN SODIUM 40 MG: 40 INJECTION SUBCUTANEOUS at 20:28

## 2020-12-06 RX ADMIN — Medication 10 ML: at 20:30

## 2020-12-06 ASSESSMENT — PAIN DESCRIPTION - LOCATION
LOCATION: NECK
LOCATION: NECK

## 2020-12-06 ASSESSMENT — PAIN SCALES - GENERAL
PAINLEVEL_OUTOF10: 0
PAINLEVEL_OUTOF10: 0
PAINLEVEL_OUTOF10: 3
PAINLEVEL_OUTOF10: 5
PAINLEVEL_OUTOF10: 0
PAINLEVEL_OUTOF10: 0

## 2020-12-06 ASSESSMENT — PAIN DESCRIPTION - FREQUENCY
FREQUENCY: CONTINUOUS
FREQUENCY: CONTINUOUS

## 2020-12-06 ASSESSMENT — PAIN DESCRIPTION - PAIN TYPE
TYPE: ACUTE PAIN
TYPE: ACUTE PAIN

## 2020-12-06 ASSESSMENT — PAIN DESCRIPTION - ONSET
ONSET: GRADUAL
ONSET: GRADUAL

## 2020-12-06 ASSESSMENT — PAIN DESCRIPTION - DESCRIPTORS
DESCRIPTORS: ACHING;DISCOMFORT
DESCRIPTORS: ACHING;DISCOMFORT

## 2020-12-06 ASSESSMENT — PAIN DESCRIPTION - PROGRESSION
CLINICAL_PROGRESSION: NOT CHANGED
CLINICAL_PROGRESSION: NOT CHANGED

## 2020-12-06 NOTE — CONSULTS
Neurology Consult Note  Reason for Consult: s/p fall, persistent leaning to L, dysmetria on L    Chief complaint: weakness    Dr Shani Mcgowan MD asked me to see Rocael Cervantes in consultation for evaluation of s/p fall, persistent leaning to L, dysmetria on L    History of Present Illness:  Rocael Cervantes is a 80 y.o. female who presents with a fall. I obtained my information via interview w/ the patient, supplemented by chart review. The patient lives at [facility]. She carries a diagnosis of gait apraxia and uses a walker. Does have a documented hx of both dizziness/vertigo as well. Also w/ old lacunar strokes (?hemorrhagic stroke)), though not sure of any chronic focal neurologic deficit. She says yesterday morning she felt fine. Around 1100 she was at her desk and when she stood up she essentially just collapsed to the ground. No reported symptoms preceding the event. No LOC. She says that she could not get herself up and felt like everything below the waist was not working. She called staff at [facility] who then called EMS in order to have her transported to the ED to be evaluated. Initial BP was 162/83. No fever. CT head was w/out any acute findings. No acute fracture on CT cervical/lumbar spine as she was complaining of back pain. No focal neurologic deficits were noted in the ED. Apparently she could not stand up. Currently, she clearly has L sided deficits. She suggests to me that this is not a chronic issue but seems to have been recurring over the last day or so, though cannot pinpoint exactly. She does have a hx of stroke, though not sure why she is not on any stroke preventative medications. Per chart, she has had some intolerance to statin therapy in the past.      Medical History:  Past Medical History:   Diagnosis Date    CAD (coronary artery disease)     Cerebral artery occlusion with cerebral infarction [facility])      History reviewed.  No pertinent surgical history. Scheduled Meds:   amLODIPine  5 mg Oral Daily    levothyroxine  25 mcg Oral QAM AC    lactobacillus  1 capsule Oral Daily with breakfast    psyllium  1 packet Oral Daily    sodium chloride flush  10 mL Intravenous 2 times per day    enoxaparin  40 mg Subcutaneous Nightly     Medications Prior to Admission:   levothyroxine (SYNTHROID) 25 MCG tablet, TAKE 1 TABLET EVERY DAY  amLODIPine (NORVASC) 5 MG tablet, Take 1 tablet by mouth daily  Psyllium (METAMUCIL) 28.3 % POWD, Take 1 each by mouth as needed  Probiotic Product (PROBIOTIC-10 PO), Take 1 capsule by mouth daily as needed     Allergies   Allergen Reactions    Sulfa Antibiotics     Trazodone     Sulfamethoxazole-Trimethoprim Nausea Only and Rash     History reviewed. No pertinent family history. Social History     Tobacco Use   Smoking Status Never Smoker   Smokeless Tobacco Never Used     Social History     Substance and Sexual Activity   Drug Use No     Social History     Substance and Sexual Activity   Alcohol Use No     ROS:  Constitutional- No weight loss or fevers  Eyes- No diplopia. No photophobia. Ears/nose/throat- No dysphagia. No Dysarthria  Cardiovascular- No palpitations. No chest pain  Respiratory- No dyspnea. No Cough  Gastrointestinal- No Abdominal pain. No Vomiting. Genitourinary- No incontinence. No urinary retention  Musculoskeletal- No myalgia. No arthralgia  Skin- No rash. No easy bruising. Psychiatric- No depression. No anxiety  Endocrine- No diabetes. No thyroid issues. Hematologic- No bleeding difficulty. No fatigue  Neurologic- + weakness. No Headache. Exam:  Blood pressure (!) 153/84, pulse 92, temperature 97.8 °F (36.6 °C), temperature source Oral, resp. rate 18, weight 119 lb 4.3 oz (54.1 kg), SpO2 97 %, not currently breastfeeding.   Constitutional    Vital signs: BP, HR, and RR reviewed   General alert, no distress, well-nourished  Eyes: unable to visualize the fundi  Cardiovascular: pulses symmetric in all 4 extremities. No peripheral edema. Psychiatric: cooperative with examination, no psychotic behavior noted. Neurologic  Mental status:   orientation to person, place, time. General fund of knowledge grossly intact   Memory grossly intact   Attention intact as able to attend well to the exam     Language fluent in conversation   Comprehension intact; follows simple commands  Cranial nerves:   CN2: visual fields full  CN 3,4,6: extraocular muscles intact  CN5: facial sensation symmetric   CN7: face symmetric without dysarthria  CN8: hearing grossly intact  CN9: palate elevated symmetrically  CN11: trap full strength on shoulder shrug  CN12: tongue midline with protrusion  Strength: LUE/LLE weakness. Deep tendon reflexes: normal in all 4 extremities  Sensory: light touch intact in all 4 extremities. Cerebellar/coordination: finger nose finger difficult to assess given weakness. Tone: normal in all 4 extremities  Gait: deferred at this time for safety given weakness. Labs  Glucose 108  Na 135  K 3.6  BUN 12  Cr 0.6  LFTs normal    WBC 6.0K  Hg 14.4  Platelets 295    COVID negative  UA negative    Studies   CT head w/o 12/5/20, independently reviewed  1. No acute intracranial abnormality. 2. Diffuse atrophic changes with findings suggesting chronic microvascular    ischemia      CT cervical spine w/o 12/5/20, independently reviewed  Unremarkable. CT lumbar spine w/o 12/5/20, independently reviewed  1. No acute lumbar fracture. 2. Grade 1 degenerative anterolisthesis L4 on L5.    3. Bones appear osteoporotic. 4. Mild-to-moderate degenerative changes of the lumbar spine commensurate    with the patient's age. Sumeet Luke is resultant mild lumbar spinal canal stenosis    L4-5. 5. Prominent calcific atherosclerosis distal aorta with narrowing of the    lumen, incompletely characterized without IV contrast enhancement. Impression  1. The patient presented after a fall. My exam today reveals significant LUE/LLE weakness, highly suspicious for acute stroke. 2.  Hx stroke; ?hemorrhagic. 3.  Hypertension. 4.  Hyperlipidemia. Recommendations  1. MRI brain w/o.    2.  MRA head w/o, neck w/wo. 3.  ECHO. 4.  HgA1c. Lipid panel. 5.  Initiate 81 mg aspirin daily. 6.  She has some documented intolerance to statin. Would trial lower dose as I suspect she has uncontrolled lipids. 7.  Telemetry. 8.  Permissive HTN is reasonable through this evening. 9.  PT/OT evaluation.       Shellye Fees NP  [address] [facility] [address] Neurology    A copy of this note was provided for Dr Patrick Mclaughlin MD

## 2020-12-06 NOTE — PROGRESS NOTES
Patient admitted after falling at home. Complains of neck pain. PRN Tylenol given crushed in applesauce. On room air. Is incontinent of bladder. Currently has pure wick in place. Edema noted in carlos lower extremities. Order for PT to see patient. Alert/oriented. Safety precautions in place. Will continue to monitor.

## 2020-12-06 NOTE — CARE COORDINATION
SW attempted to place phone call to patient's room to complete intake assessment, however, her line was busy. SW to try again later if time permits. Respectfully submitted,    FILIPE Santillan  [facility]   [phone]    Electronically signed by FILIPE Muse on 12/6/2020 at 10:41 AM

## 2020-12-06 NOTE — DISCHARGE INSTR - COC
Continuity of Care Form    Patient Name: Shlomo Hartman   :  3/18/1931  MRN:  [id number]    Admit date:  2020  Discharge date:  2020    Code Status Order: Full Code   Advance Directives:   [facility] Documentation       Date/Time Healthcare Directive Type of Healthcare Directive Copy in [facility] [address] Agent's Name Healthcare Agent's Phone Number    20  No, patient does not have an advance directive for healthcare treatment -- -- -- -- --            Admitting Physician:  Miah Liu MD  PCP: Shraddha Anand MD    Discharging Nurse: [facility] Unit/Room#: [id number]-2982/0001-48  Discharging Unit Phone Number: [phone]    Emergency Contact:   Extended Emergency Contact Information  Primary Emergency Contact: [address] [place] [address] Phone: [phone]  Relation: Child  Secondary Emergency Contact: Phill Garcia  Phone: [phone]  Relation: Child    Past Surgical History:  No past surgical history on file.     Immunization History:   Immunization History   Administered Date(s) Administered    Influenza 10/20/2017    Influenza Virus Vaccine 10/30/2008, 2010, 10/24/2011, 10/08/2013, 10/09/2014    Influenza Whole 2010, 10/24/2011    Influenza, MDCK Quadv, IM, PF (Flucelvax 4 yrs and older) 10/07/2019, 10/08/2019    Influenza, Quadv, IM, PF (6 mo and older Fluzone, Flulaval, Fluarix, and 3 yrs and older Afluria) 2018    Pneumococcal Polysaccharide (Agcnaitsb51) 2008, 04/10/2014    Td vaccine (adult) 2012    Tdap (Boostrix, Adacel) 2012       Active Problems:  Patient Active Problem List   Diagnosis Code    Benign paroxysmal positional vertigo H81.10    Chronic kidney disease, stage II (mild) N18.2    Coronary atherosclerosis I25.10    Hypertension I10    Hypothyroid E03.9    Insomnia G47.00    Vasovagal syncope R55    Vitamin D deficiency E55.9    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.2    Depression F32.9    Frailty syndrome in geriatric patient R48    Debility R53.81    Frequent UTI N39.0    Lumbar radiculopathy M54.16    Urinary incontinence R32    Fall against object W18.00XA       Isolation/Infection:   Isolation            No Isolation          Patient Infection Status       Infection Onset Added Last Indicated Last Indicated By Review Planned Expiration Resolved Resolved By    None active    Resolved    COVID-19 Rule Out 12/05/20 12/05/20 12/05/20 COVID-19 (Ordered)   12/05/20 Rule-Out Test Resulted            Nurse Assessment:  Last Vital Signs: BP (!) 153/84   Pulse 92   Temp 97.8 °F (36.6 °C) (Oral)   Resp 18   Wt 119 lb 4.3 oz (54.1 kg)   SpO2 97%   BMI 21.81 kg/m²     Last documented pain score (0-10 scale): Pain Level: 5  Last Weight:   Wt Readings from Last 1 Encounters:   12/06/20 119 lb 4.3 oz (54.1 kg)     Mental Status:  oriented and alert    IV Access:  - None    Nursing Mobility/ADLs:  Walking   Dependent  Transfer  Assisted  Bathing  Dependent  Dressing  Dependent  Toileting  Dependent  Feeding  Assisted  Med Admin  Assisted  Med Delivery   whole and prefers mixed with applesauce    Wound Care Documentation and Therapy:        Elimination:  Continence:   · Bowel: Yes  · Bladder: Yes and No  Urinary Catheter: None   Colostomy/Ileostomy/Ileal Conduit: No       Date of Last BM: 12/8/20  No intake or output data in the 24 hours ending 12/06/20 1059  No intake/output data recorded. Safety Concerns:     History of Falls (last 30 days) and At Risk for Falls    Impairments/Disabilities:      Hearing    Nutrition Therapy:  Current Nutrition Therapy:   - Oral Diet:  General and Dysphagia 2 mechanically altered    Routes of Feeding: Oral  Liquids:  Thin Liquids  Daily Fluid Restriction: no  Last Modified Barium Swallow with Video (Video Swallowing Test): yes done on 12/8/20    Treatments at the Time of Hospital Discharge:   Respiratory Treatments: NA  Oxygen Therapy:  is not on home oxygen therapy. Ventilator:    - No ventilator support    Rehab Therapies: Physical Therapy, Occupational Therapy and Speech/Language Therapy  Weight Bearing Status/Restrictions: No weight bearing restirctions  Other Medical Equipment (for information only, NOT a DME order):  wheelchair  Other Treatments:     Patient's personal belongings (please select all that are sent with patient):  none    RN SIGNATURE:  Electronically signed by Wing Loulou RN on 12/9/20 at 2:32 PM EST    CASE MANAGEMENT/SOCIAL WORK SECTION    Inpatient Status Date: OBS 12/5/2020    Readmission Risk Assessment Score:  Readmission Risk              Risk of Unplanned Readmission:        0           Discharging to Facility/ Agency   · Name: [address] [place] [address]  · [address] [facility] [address]  · Phone: [phone]  · Fax: [phone]  Program: Independent Living    / signature: Electronically signed by FILIPE Ma on 12/6/2020 at 11:00 AM      PHYSICIAN SECTION    Prognosis: Fair    Condition at Discharge: Stable    Rehab Potential (if transferring to Rehab): Fair    Recommended Labs or Other Treatments After Discharge: PT/OT, abx for L wrist induration    Physician Certification: I certify the above information and transfer of Mackenzie Addison  is necessary for the continuing treatment of the diagnosis listed and that she requires [place] for less 30 days.      Update Admission H&P: No change in H&P    PHYSICIAN SIGNATURE:  Electronically signed by Maryuri Holt MD on 12/9/20 at 11:31 AM EST

## 2020-12-06 NOTE — PROGRESS NOTES
Transfer handoff given to RN [facility]. Answered all questions. Pt transferred to 3N to room 66. [place] Electronically signed by Ana Paula nOeil RN on 12/5/2020 at 10:07 PM

## 2020-12-06 NOTE — PROGRESS NOTES
Physical Therapy    Facility/Department: [address] IP REHAB  Initial Assessment    NAME: Agueda Day  : 3/18/1931  MRN: [id number]    Date of Service: 2020    Discharge Recommendations:  Patient would benefit from continued therapy after discharge, 3-5 sessions per week     Agueda Day scored a  on the AM-PAC short mobility form. Current research shows that an AM-PAC score of 17 or less is typically not associated with a discharge to the patient's home setting. Based on the patient's AM-PAC score and their current functional mobility deficits, it is recommended that the patient have 3-5 sessions per week of Physical Therapy at d/c to increase the patient's independence. Please see assessment section for further patient specific details. If patient discharges prior to next session this note will serve as a discharge summary. Please see below for the latest assessment towards goals. Assessment   Body structures, Functions, Activity limitations: Decreased functional mobility ; Decreased coordination;Decreased strength;Decreased fine motor control  Assessment: Patient is a 51-year-old female with past medical history of CAD, hypertension, hypothyroidism who presents to the hospital after a fall at [facility]. Pt report she doesn't know how she fell, but now she has a tough time moving her left side. Pt reports she was independent with all functional mobility prior to admission, using a RW for ambulation prior to admission. Pt currently presenting with dysmetria of left side, slight left facial droop, inability to coordinate left LE to stand pivot or ambulate with RW - Dr. Everette Barajas present to witness all movement. Pt functioning well below baseline. Recommend continued skilled therapy 3-5x/week to maximize independence and safety with functional mobility.   Treatment Diagnosis: impaired mobility  Prognosis: Fair  Decision Making: Medium Complexity  History: see below  Exam: see below  Clinical Presentation: evolving  PT Education: PT Role;Plan of Care;General Safety  REQUIRES PT FOLLOW UP: Yes  Activity Tolerance  Activity Tolerance: Patient Tolerated treatment well;Treatment limited secondary to medical complications (free text)       Patient Diagnosis(es): The primary encounter diagnosis was Fall, initial encounter. Diagnoses of Neck pain, Low back pain without sciatica, unspecified back pain laterality, unspecified chronicity, and General weakness were also pertinent to this visit. has a past medical history of CAD (coronary artery disease) and Cerebral artery occlusion with cerebral infarction ([facility] has no past surgical history on file. Restrictions  Restrictions/Precautions  Restrictions/Precautions: Fall Risk  Position Activity Restriction  Other position/activity restrictions: L sided weakness     Vision/Hearing  Hearing: Exceptions to [facility]  Hearing Exceptions: Hard of hearing/hearing concerns       Subjective  General  Chart Reviewed: Yes  Patient assessed for rehabilitation services?: Yes  Additional Pertinent Hx: Patient is a 80-year-old female with past medical history of CAD, hypertension, hypothyroidism who presents to the hospital after a fall at [facility]. Pt report she doesn't know how she fell, but now she has a tough time moving her left side. Response To Previous Treatment: Not applicable  Family / Caregiver Present: No  Referring Practitioner: Helen Jane MD  Referral Date : 12/05/20  Diagnosis: Fall  Follows Commands: Within Functional Limits  Subjective  Subjective: Pt is agreeable to PT - reports she's having left sided weakness, hard to move.           Social/Functional History  Social/Functional History  Type of Home: ([facility]  Home Layout: One level  Home Access: Level entry  Bathroom Shower/Tub: Walk-in shower  Bathroom Toilet: Handicap height  Bathroom Equipment: Built-in shower seat, Grab bars in shower  ADL Assistance: Independent  Homemaking Assistance: (has cleaning lady 1x/month)  Ambulation Assistance: Independent(RW)  Transfer Assistance: Independent  Active : No  Additional Comments: Denies any recent falls. Cognition   Cognition  Overall Cognitive Status: WFL    Objective  Observation/Palpation  Observation: slight left facial droop, dysmetria with left sided movement, left sided lean sitting EOB       Strength RLE  Strength RLE: WFL  Strength LLE  Strength LLE: Exception  Comment: mild generalized weakness  Tone RLE  RLE Tone: (slightly hypertonic)  Motor Control  Gross Motor?: Exceptions  Coordination  Rapid Alternating Movements: Dysdiadokinesia  Finger to Nose: Dysmetric     Bed mobility  Supine to Sit: Moderate assistance  Sit to Supine: Unable to assess(in recliner at end of session)  Comment: heavy left sided lean at EOB - but self controlled  Transfers  Sit to Stand: Maximum Assistance  Stand to sit: Maximum Assistance  Bed to Chair: Maximum assistance  Stand Pivot Transfers: Maximum Assistance  Comment: Pt unable to stand pivot with walker - left knee stays rigid and in extension  Ambulation  Ambulation?: No(unable to advance walker or coordinate LLE)     Balance  Posture: Poor  Sitting - Static: Fair;+  Sitting - Dynamic: Fair  Standing - Static: Poor        Plan   Plan  Times per week: 3-5x/week  Current Treatment Recommendations: Functional Mobility Training  Safety Devices  Type of devices:  All fall risk precautions in place, Call light within reach, Gait belt, Left in chair, Chair alarm in place, Patient at risk for falls, Nurse notified      AM-PAC Score  -PAC Inpatient Mobility Raw Score : 6 (12/06/20 1136)  -PAC Inpatient T-Scale Score : 23.55 (12/06/20 1136)  Mobility Inpatient CMS 0-100% Score: 100 (12/06/20 1136)  Mobility Inpatient CMS G-Code Modifier : CN (12/06/20 1136)          Goals  Short term goals  Time Frame for Short term goals: by acute discharge  Short term goal 1: bed mobility with SBA  Short term goal 2: sit<>Stand with CGA  Short term goal 3: ambulate >10' with RW and CGA  Patient Goals   Patient goals : get well       Therapy Time   Individual Concurrent Group Co-treatment   Time In 1040         Time Out 1135         Minutes 55         Timed Code Treatment Minutes: 40 Minutes       Ladarius Combs, PT

## 2020-12-06 NOTE — PROGRESS NOTES
Nurse has been in room for 15 minutes, informed patient need to care for other patients, \" please come back\".

## 2020-12-06 NOTE — PROGRESS NOTES
Pt arrived to floor from ER  via stretcher. Pt oriented to room, call light, policies and procedures, the menu and ordering. Call light within reach. Bed in lowest position, bed alarm on, and wheels locked. Pt verbalized understanding. No complaints, questions or concerns at this time. Admission assessment complete, night medications given, prn tynlenol given for neck pain, pt satisfied. Denies any needs at this time. Will continue to monitor. Electronically signed by Brandon Akins RN on 12/5/2020 at 10:36 PM

## 2020-12-06 NOTE — CARE COORDINATION
INITIAL CASE MANAGEMENT ASSESSMENT    Reviewed chart, met with patient to assess possible discharge needs. Explained Case Management role/services. SW interviewed daughter Kelvin Augustine via telephone today as patient did not answer when we called in. Living Situation: Patient resides at [facility] independent living in her own apartment. ADLs: Prior to medical admission patient received assistance with cooking. Family doesn't anticipate any needs at discharge. DME: Prior to medical admission patient used a walker and a transport chair. Family doesn't anticipate any needs at discharge. PT/OT Recs: Ordered. SW will follow for recommendations. Active Services: Prior to medical admission patient received no active services. Family doesn't anticipate any needs at discharge. Transportation: Prior to medical admission family transported patient to and from appointments and errands. Son Garcia Urbina will transport this patient at discharge. Medications: [employer] Long term and [employer] Short Term. Thee patient has no issues with access or affordability at this time. PCP: Tea Amaya [phone] (phone) and [phone](YNQ number). Family can't remember her last appointment with this provider. PLAN/COMMENTS:   1) PT/OT consult and follow up with recommendations. SW provided contact information for patient or family to call with any questions. SW will follow and assist as needed. Respectfully submitted,    FILIPE Iniguez  [facility]   [phone]    Electronically signed by FILIPE Vinson on 12/6/2020 at 10:43 AM

## 2020-12-06 NOTE — PROGRESS NOTES
Cardiology Progress Note        Patient: Italia Cano        Sex: female          DOA: 6/19/2020  YOB: 1939      Age:  [de-identified] y.o.        LOS:  LOS: 6 days   Assessment/Plan   RICHARD done. No evidence of infective endocarditis. discussed with patient.       Signed By: Italia Dewitt MD     June 25, 2020 Nurse again sitting with patient.

## 2020-12-06 NOTE — PROGRESS NOTES
Patient made aware of order for MRI, \"states I just want to go\", voices does not want any CPR, extra measures. Relief with Zofran.

## 2020-12-07 PROBLEM — I63.9 ISCHEMIC STROKE (HCC): Status: ACTIVE | Noted: 2020-12-07

## 2020-12-07 LAB
ANION GAP SERPL CALCULATED.3IONS-SCNC: 10 MMOL/L (ref 3–16)
BUN BLDV-MCNC: 16 MG/DL (ref 7–20)
CALCIUM SERPL-MCNC: 9 MG/DL (ref 8.3–10.6)
CHLORIDE BLD-SCNC: 102 MMOL/L (ref 99–110)
CHOLESTEROL, TOTAL: 203 MG/DL (ref 0–199)
CO2: 25 MMOL/L (ref 21–32)
CREAT SERPL-MCNC: 0.7 MG/DL (ref 0.6–1.2)
ESTIMATED AVERAGE GLUCOSE: 125.5 MG/DL
GFR AFRICAN AMERICAN: >60
GFR NON-AFRICAN AMERICAN: >60
GLUCOSE BLD-MCNC: 93 MG/DL (ref 70–99)
HBA1C MFR BLD: 6 %
HCT VFR BLD CALC: 42.7 % (ref 36–48)
HDLC SERPL-MCNC: 76 MG/DL (ref 40–60)
HEMOGLOBIN: 14.5 G/DL (ref 12–16)
LDL CHOLESTEROL CALCULATED: 104 MG/DL
LV EF: 63 %
LVEF MODALITY: NORMAL
MCH RBC QN AUTO: 29.5 PG (ref 26–34)
MCHC RBC AUTO-ENTMCNC: 34 G/DL (ref 31–36)
MCV RBC AUTO: 86.7 FL (ref 80–100)
PDW BLD-RTO: 14.6 % (ref 12.4–15.4)
PLATELET # BLD: 130 K/UL (ref 135–450)
PMV BLD AUTO: 9.2 FL (ref 5–10.5)
POTASSIUM REFLEX MAGNESIUM: 4.1 MMOL/L (ref 3.5–5.1)
RBC # BLD: 4.92 M/UL (ref 4–5.2)
SODIUM BLD-SCNC: 137 MMOL/L (ref 136–145)
T4 FREE: 1.2 NG/DL (ref 0.9–1.8)
TRIGL SERPL-MCNC: 117 MG/DL (ref 0–150)
TSH REFLEX FT4: 4.35 UIU/ML (ref 0.27–4.2)
VLDLC SERPL CALC-MCNC: 23 MG/DL
WBC # BLD: 5.3 K/UL (ref 4–11)

## 2020-12-07 PROCEDURE — 92610 EVALUATE SWALLOWING FUNCTION: CPT

## 2020-12-07 PROCEDURE — 97530 THERAPEUTIC ACTIVITIES: CPT

## 2020-12-07 PROCEDURE — 85027 COMPLETE CBC AUTOMATED: CPT

## 2020-12-07 PROCEDURE — 1200000000 HC SEMI PRIVATE

## 2020-12-07 PROCEDURE — 83036 HEMOGLOBIN GLYCOSYLATED A1C: CPT

## 2020-12-07 PROCEDURE — 84439 ASSAY OF FREE THYROXINE: CPT

## 2020-12-07 PROCEDURE — 84443 ASSAY THYROID STIM HORMONE: CPT

## 2020-12-07 PROCEDURE — 80061 LIPID PANEL: CPT

## 2020-12-07 PROCEDURE — 97535 SELF CARE MNGMENT TRAINING: CPT

## 2020-12-07 PROCEDURE — 6370000000 HC RX 637 (ALT 250 FOR IP): Performed by: INTERNAL MEDICINE

## 2020-12-07 PROCEDURE — 6360000002 HC RX W HCPCS: Performed by: INTERNAL MEDICINE

## 2020-12-07 PROCEDURE — 97167 OT EVAL HIGH COMPLEX 60 MIN: CPT

## 2020-12-07 PROCEDURE — 36415 COLL VENOUS BLD VENIPUNCTURE: CPT

## 2020-12-07 PROCEDURE — 6370000000 HC RX 637 (ALT 250 FOR IP): Performed by: NURSE PRACTITIONER

## 2020-12-07 PROCEDURE — G0378 HOSPITAL OBSERVATION PER HR: HCPCS

## 2020-12-07 PROCEDURE — 80048 BASIC METABOLIC PNL TOTAL CA: CPT

## 2020-12-07 PROCEDURE — 93306 TTE W/DOPPLER COMPLETE: CPT

## 2020-12-07 PROCEDURE — 2580000003 HC RX 258: Performed by: NURSE PRACTITIONER

## 2020-12-07 PROCEDURE — 97110 THERAPEUTIC EXERCISES: CPT | Performed by: PHYSICAL THERAPIST

## 2020-12-07 PROCEDURE — 97530 THERAPEUTIC ACTIVITIES: CPT | Performed by: PHYSICAL THERAPIST

## 2020-12-07 RX ORDER — POLYETHYLENE GLYCOL 3350 17 G/17G
17 POWDER, FOR SOLUTION ORAL DAILY PRN
Status: DISCONTINUED | OUTPATIENT
Start: 2020-12-07 | End: 2020-12-09 | Stop reason: HOSPADM

## 2020-12-07 RX ORDER — ATORVASTATIN CALCIUM 80 MG/1
80 TABLET, FILM COATED ORAL NIGHTLY
Status: DISCONTINUED | OUTPATIENT
Start: 2020-12-07 | End: 2020-12-09 | Stop reason: HOSPADM

## 2020-12-07 RX ORDER — SODIUM CHLORIDE 0.9 % (FLUSH) 0.9 %
10 SYRINGE (ML) INJECTION EVERY 12 HOURS SCHEDULED
Status: DISCONTINUED | OUTPATIENT
Start: 2020-12-07 | End: 2020-12-09 | Stop reason: HOSPADM

## 2020-12-07 RX ORDER — ASPIRIN 81 MG/1
81 TABLET ORAL DAILY
Status: DISCONTINUED | OUTPATIENT
Start: 2020-12-07 | End: 2020-12-09 | Stop reason: HOSPADM

## 2020-12-07 RX ORDER — PROMETHAZINE HYDROCHLORIDE 25 MG/1
12.5 TABLET ORAL EVERY 6 HOURS PRN
Status: DISCONTINUED | OUTPATIENT
Start: 2020-12-07 | End: 2020-12-09 | Stop reason: HOSPADM

## 2020-12-07 RX ORDER — SODIUM CHLORIDE 0.9 % (FLUSH) 0.9 %
10 SYRINGE (ML) INJECTION PRN
Status: DISCONTINUED | OUTPATIENT
Start: 2020-12-07 | End: 2020-12-09 | Stop reason: HOSPADM

## 2020-12-07 RX ORDER — ONDANSETRON 2 MG/ML
4 INJECTION INTRAMUSCULAR; INTRAVENOUS EVERY 6 HOURS PRN
Status: DISCONTINUED | OUTPATIENT
Start: 2020-12-07 | End: 2020-12-09 | Stop reason: HOSPADM

## 2020-12-07 RX ORDER — ASPIRIN 300 MG/1
300 SUPPOSITORY RECTAL DAILY
Status: DISCONTINUED | OUTPATIENT
Start: 2020-12-07 | End: 2020-12-09 | Stop reason: HOSPADM

## 2020-12-07 RX ADMIN — Medication 1 CAPSULE: at 09:37

## 2020-12-07 RX ADMIN — ASPIRIN 81 MG: 81 TABLET, COATED ORAL at 09:35

## 2020-12-07 RX ADMIN — LEVOTHYROXINE SODIUM 25 MCG: 0.03 TABLET ORAL at 06:00

## 2020-12-07 RX ADMIN — SODIUM CHLORIDE, PRESERVATIVE FREE 10 ML: 5 INJECTION INTRAVENOUS at 09:38

## 2020-12-07 RX ADMIN — PSYLLIUM HUSK 1 PACKET: 3.4 GRANULE ORAL at 09:35

## 2020-12-07 RX ADMIN — ATORVASTATIN CALCIUM 80 MG: 80 TABLET, FILM COATED ORAL at 20:02

## 2020-12-07 RX ADMIN — SODIUM CHLORIDE, PRESERVATIVE FREE 10 ML: 5 INJECTION INTRAVENOUS at 21:25

## 2020-12-07 RX ADMIN — ENOXAPARIN SODIUM 40 MG: 40 INJECTION SUBCUTANEOUS at 20:02

## 2020-12-07 RX ADMIN — AMLODIPINE BESYLATE 5 MG: 5 TABLET ORAL at 09:34

## 2020-12-07 NOTE — PROGRESS NOTES
Hospitalist Progress Note      PCP: Shraddha Anand MD    Date of Admission: 12/5/2020    Chief Complaint:     Hospital Course: This is an 66-year-old female with a history of CAD and prior CVA, hyperlipidemia who presented to the ED with complaints of a fall that occurred at the nursing home. She goes on to describe leg weakness but denies dizziness or lightheadedness prior to the event. Neurology was consulted. MRI of the head ordered and still pending at this time. Subjective:   Very hard of hearing but states that she has been unable to move her left arm since her fall, she states that she \"thinks I had a stroke. \"  Denies fevers, chills, chest pain or shortness of breath at this time. No nausea vomiting or abdominal pain. Medications:  Reviewed    Infusion Medications   Scheduled Medications    sodium chloride flush  10 mL Intravenous 2 times per day    aspirin  81 mg Oral Daily    Or    aspirin  300 mg Rectal Daily    atorvastatin  80 mg Oral Nightly    amLODIPine  5 mg Oral Daily    levothyroxine  25 mcg Oral QAM AC    lactobacillus  1 capsule Oral Daily with breakfast    psyllium  1 packet Oral Daily    enoxaparin  40 mg Subcutaneous Nightly     PRN Meds: sodium chloride flush, polyethylene glycol, promethazine **OR** ondansetron, potassium chloride, magnesium sulfate, acetaminophen **OR** acetaminophen, magnesium hydroxide, melatonin      Intake/Output Summary (Last 24 hours) at 12/7/2020 1438  Last data filed at 12/7/2020 1403  Gross per 24 hour   Intake 1310 ml   Output 900 ml   Net 410 ml       Physical Exam Performed:    BP (!) 152/73   Pulse 84   Temp 97.4 °F (36.3 °C) (Oral)   Resp 16   Wt 119 lb 0.8 oz (54 kg)   SpO2 95%   BMI 21.77 kg/m²     General appearance: No apparent distress, appears stated age and cooperative. HEENT: Pupils equal, round, and reactive to light. Conjunctivae/corneas clear. Very hard of hearing  Neck: Supple, with full range of motion.

## 2020-12-07 NOTE — PROGRESS NOTES
Speech Language Pathology  Facility/Department: [address] IP REHAB   CLINICAL BEDSIDE SWALLOW EVALUATION    NAME: Tamanna Robin  : 3/18/1931  MRN: [id number]    ADMISSION DATE: 2020  ADMITTING DIAGNOSIS: has Benign paroxysmal positional vertigo; Chronic kidney disease, stage II (mild); Coronary atherosclerosis; Hypertension; Hypothyroid; Insomnia; Vasovagal syncope; Vitamin D deficiency; Old myocardial infarction; Depression; Frailty syndrome in geriatric patient; Debility; Frequent UTI; Lumbar radiculopathy; Urinary incontinence; and Fall against object on their problem list.  ONSET DATE: 2020    Recent Chest Xray 2020  Impression    No active cardiopulmonary disease      CT Head 2020  Impression    No acute intracranial abnormality.         Diffuse atrophic changes with findings suggesting chronic microvascular    ischemia     HPI per neuro:History of Present Illness:  Tamanna Robin is a 80 y.o. female who presents with a fall. The patient lives at [facility] [place] [facility]. She carries a diagnosis of gait apraxia and uses a walker. Does have a documented hx of both dizziness/vertigo as well. Also w/ old lacunar strokes (?hemorrhagic stroke)), though not sure of any chronic focal neurologic deficit. She says yesterday morning she felt fine. Around 1100 she was at her desk and when she stood up she essentially just collapsed to the ground. No reported symptoms preceding the event. No LOC. She says that she could not get herself up and felt like everything below the waist was not working. She called staff at [facility] who then called EMS in order to have her transported to the ED to be evaluated. Initial BP was 162/83. No fever. CT head was w/out any acute findings. No acute fracture on CT cervical/lumbar spine as she was complaining of back pain. No focal neurologic deficits were noted in the ED. Apparently she could not stand up. Currently, she clearly has L sided deficits.  Highly suspicious for acute stroke    Date of Eval: 12/7/2020  Evaluating Therapist: Bonita Lin    Current Diet level:  Current Diet : Regular  Current Liquid Diet : Thin    Primary Complaint  Patient Complaint: pt does not generate complaint; referral d/t CVA symptoms with left sided weakness and anterior loss of liquids with concern for pocketing    Pain:  Pain Assessment  Pain Assessment: 0-10  Pain Level: 0    Reason for Referral  Mily Mackenzie was referred for a bedside swallow evaluation to assess the efficiency of her swallow function, identify signs and symptoms of aspiration and make recommendations regarding safe dietary consistencies, effective compensatory strategies, and safe eating environment. Impression  Dysphagia Diagnosis: Mild to moderate oral stage dysphagia;Mild to moderate pharyngeal stage dysphagia  Dysphagia Impression :   Pt alert, cooperative but requires cues d/t hard of hearing; follows simple commands and is oriented to self. PO assessment limited to thin (straw) and nectar (cup/straw), puree, and mince/moist textures 2/2 transport arriving to take pt to ECHO. · Mild to moderate oropharyngeal dysphagia characterized by left sided labial and lingual weakness, and decreased lingual coordination affecting bolus formation and control, decreased AP transit with high suspicion for premature bolus loss to pharynx, delayed swallow initiation, and decreased laryngeal elevation. · Immediate prolonged cough with thin via straw and nectar via cup when fed by SLP; no cough with nectar via straw when self-administering small sips. Multiple swallows noted with all consistencies. · Variable minimal to mild oral residue with puree and minced/moist texture, with variable pt awareness to clear. Residue is cleared with liquid wash. · Recommend downgrade to minced/moist diet texture and mildly nectar thick liquids, close monitor, small sips.     · Pt may benefit from MBS v additional diet downgrade if s/s persist, as well as SLP assessment, pending results of diagnostic workup. ST will follow closely. Dysphagia Outcome Severity Scale: Level 3: Moderate dysphagia- Total assisstance, supervision or strategies. Two or more diet consistencies restricted     Treatment Plan  Requires SLP Intervention: Yes  Duration/Frequency of Treatment: 3-5x/wk while on acute unit  D/C Recommendations: To be determined     Recommended Diet and Intervention  Diet Solids Recommendation: Dysphagia Minced and Moist (Dysphagia II)  Liquid Consistency Recommendation: Mildly Thick (Nectar)  Recommended Form of Meds: Meds in puree  Recommendations: Dysphagia treatment  Therapeutic Interventions: Diet tolerance monitoring; Therapeutic PO trials with SLP;Patient/Family education;Oral motor exercises; Bolus control exercises; Pharyngeal exercises; Laryngeal exercises    Compensatory Swallowing Strategies  Compensatory Swallowing Strategies: Upright as possible for all oral intake;Remain upright for 30-45 minutes after meals;Eat/Feed slowly; Assist feed;Small bites/sips;Swallow 2 times per bite/sip    Treatment/Goals  Dysphagia Goals: The patient will tolerate recommended diet without observed clinical signs of aspiration; The patient will improve oral motor function via therapeutic oral motor exercises to 5/5 each trial.;The patient/caregiver will demonstrate understanding of compensatory strategies for improved swallowing safety. ;The patient will tolerate mechanical soft foods 10/10. ;The patient will tolerate thin liquids without signs and symptoms of aspiration 10/10 via straw. General  Chart Reviewed: Yes  Behavior/Cognition: Alert; Cooperative;Confused  Respiratory Status: Room air  Communication Observation: Functional  Follows Directions: Simple(hearing is a barrier)  Dentition: Some missing teeth  Patient Positioning: Upright in chair  Baseline Vocal Quality: Hoarse  Volitional Cough: Strong  Consistencies Administered: Dysphagia Minced and Moist (Dysphagia II); Dysphagia Pureed (Dysphagia I); Thin - straw;Nectar - straw     Vision/Hearing  Hearing  Hearing: Exceptions to WFL(Pt reports she wears hearing aids, but they are at [facility] [place] [place])  Hearing Exceptions: Hard of hearing/hearing concerns    Oral Motor Deficits  Oral/Motor  Oral Motor: Exceptions to [facility]  Labial ROM: Reduced left  Labial Symmetry: Abnormal symmetry left  Labial Strength: Reduced  Labial Sensation: (suspected)  Lingual ROM: Reduced left; Reduced right  Lingual Symmetry: Abnormal symmetry left  Lingual Strength: Reduced  Lingual Coordination: Reduced  Gag: (present bilaterally)    Oral Phase Dysfunction  Oral Phase  Oral Phase: Exceptions  Oral Phase Dysfunction  Impaired Mastication: Mechanical soft  Decreased Anterior to Posterior Transit: All  Suspected Premature Bolus Loss: All  Lingual/Palatal Residue: Mechanical soft;Puree     Indicators of Pharyngeal Phase Dysfunction   Pharyngeal Phase  Pharyngeal Phase: Exceptions  Indicators of Pharyngeal Phase Dysfunction  Delayed Swallow: All  Decreased Laryngeal Elevation: All  Cough - Immediate: Thin - straw;Nectar - cup    Prognosis  Prognosis  Prognosis for safe diet advancement: fair  Barriers to reach goals: age  Individuals consulted  Consulted and agree with results and recommendations: Patient;RN    Education  Patient Education: results, recommendations  Patient Education Response: Verbalizes understanding;Needs reinforcement        Therapy Time  SLP Individual Minutes  Time In: 0741  Time Out: 1100  Minutes: 30     This note serves as a D/C Summary in the event that this patient is discharged prior to the next therapy session.     Tejas Banks MS, CCC-SLP #[id number]  Speech Language Pathologist  12/7/2020 11:20 AM

## 2020-12-07 NOTE — PROGRESS NOTES
Physical Therapy  Facility/Department: [address] REHAB  Daily Treatment Note  NAME: Uzma Lopez  : 3/18/1931  MRN: [id number]    Date of Service: 2020    Discharge Recommendations:  Patient would benefit from continued therapy after discharge, 3-5 sessions per week     Uzma Lopez scored a  on the AM-PAC short mobility form. Current research shows that an AM-PAC score of 17 or less is typically not associated with a discharge to the patient's home setting. Based on the patient's AM-PAC score and their current functional mobility deficits, it is recommended that the patient have 3-5 sessions per week of Physical Therapy at d/c to increase the patient's independence. Please see assessment section for further patient specific details. If patient discharges prior to next session this note will serve as a discharge summary. Please see below for the latest assessment towards goals. Assessment   Body structures, Functions, Activity limitations: Decreased functional mobility ; Decreased coordination;Decreased strength;Decreased fine motor control  Assessment: Patient is a 26-year-old female with past medical history of CAD, hypertension, hypothyroidism who presents to the hospital after a fall at [facility]. Pt report she doesn't know how she fell, but now she has a tough time moving her left side. Pt reports she was independent with all functional mobility prior to admission, using a RW for ambulation prior to admission. Pt currently presenting with limited sensation on L side, decreased balance, neglect on L side, extensor left LE/UE to stand pivot  and limited Resighini/cognition. Patient dependent for all mobility, with sitting balance poor with push towards L side with difficulty controlling movements on L side. Pt functioning well below baseline. Recommend continued skilled therapy 3-5x/week to maximize independence and safety with functional mobility. Patient would benefit from rehab setting but would prefer a SNU. Treatment Diagnosis: impaired mobility  Prognosis: Fair  Decision Making: Medium Complexity  History: see below  Exam: see below  Clinical Presentation: evolving  PT Education: PT Role;Plan of Care;General Safety;Weight-bearing Education;Orientation;Equipment;Transfer Training  Barriers to Learning: [place], limited memory, neglect on L side  REQUIRES PT FOLLOW UP: Yes  Activity Tolerance  Activity Tolerance: Patient Tolerated treatment well;Treatment limited secondary to medical complications (free text); Patient limited by endurance; Patient limited by fatigue;Patient limited by cognitive status  Activity Tolerance: Patient limited by fatigue, limited STM, [place]     Patient Diagnosis(es): The primary encounter diagnosis was Fall, initial encounter. Diagnoses of Neck pain, Low back pain without sciatica, unspecified back pain laterality, unspecified chronicity, and General weakness were also pertinent to this visit. has a past medical history of CAD (coronary artery disease) and Cerebral artery occlusion with cerebral infarction ([facility] [address] has no past surgical history on file. Restrictions  Restrictions/Precautions  Restrictions/Precautions: Fall Risk  Position Activity Restriction  Other position/activity restrictions: L sided weakness, neglect L side  Subjective   General  Chart Reviewed: Yes  Additional Pertinent Hx: Patient is a 77-year-old female with past medical history of CAD, hypertension, hypothyroidism who presents to the hospital after a fall at [facility]. Pt report she doesn't know how she fell, but now she has a tough time moving her left side. Response To Previous Treatment: Not applicable;Patient with no complaints from previous session. Family / Caregiver Present: No  Referring Practitioner: Marilin Pandey MD  Subjective  Subjective: Pt is agreeable to PT. Patient wanting to get out of bed, very [place], states hearing aides are at home.  Patient Group Co-treatment   Time In 0900 0815   Time Out 0915 0900   Minutes 15     45   Timed Code Treatment Minutes: [address], PT # [id number]

## 2020-12-07 NOTE — CARE COORDINATION
OBSERVATION STATUS  LSW reviewed chart. LSW called Lexa Shock at [facility] to inquire if there was a [facility] and she reports it is son, Shu Nation. Call placed to nay Nation [phone] to introduce  role and to initiate discussion regarding DC planning. LSW informed him of recommendation for SNF. He reports the plan would be to go to [facility] but she usually lives in her own apartment. LSW informed him of recommendation from therapy Team for SNF to build up to being able to be alone. He does want [facility] SNF. The Plan for Transition of Care is related to the following treatment goals: To continue her progress in personal care and ambulation needs in SNF setting. The  patient representative, son, Shu Nation,  was provided with a choice of provider and agrees   with the discharge plan. [x] Yes [] No    Freedom of choice list was provided with basic dialogue that supports the patient's individualized plan of care/goals, treatment preferences and shares the quality data associated with the providers. [x] Yes [] No  REferral initiated. Will need COVID TEST and either [facility] or [facility] if admitted.   Denice Luna [place]     Case Management   [phone]    12/7/2020  2:35 PM

## 2020-12-07 NOTE — PROGRESS NOTES
and transfers d/t use of helio stedy and completed sit <> stands to/from helio azucenady with Max Ax2. Anticipate pt will require up to Total A for ADLs at this time d/t the above deficits. Will continue to work with pt while on acute, but recommend pt recieve skilled therapy at low-moderate intensity 3-5x/wk upon discharge to maximize occupational performance. Pt would most likely benefit from 5-7 sessions per week,Pt reports she wants to recieve skillled therapy at [facility]. Prognosis: Fair  Decision Making: High Complexity  History: Patient is a 80-year-old female with past medical history of CAD, hypertension, hypothyroidism who presents to the hospital after a fall at group home. According to the patient she was trying to take down and she fell to the ground. Patient did not feel lightheaded or dizzy before the event patient mentions she thinks like her legs have been weak and she has difficulty even sitting up. Patient denies any new numbness tingling. Patient endorses hitting the back of her head on the ground. Patient endorses generalized weakness difficulty ambulation. Patient denies chest pain shortness of breath nausea vomiting diarrhea constipation fevers chills. (copied per Dr Poi Quiros, 12/5)  Exam: bed mobility, transfers, fx mobility, UE ROM/strength, ADLs  Assistance / Modification: helio stedy for fx mobility/transfers -- anticipate up to Total A for ADLs at this time  OT Education: OT Role;Plan of Care;Transfer Training  Patient Education: pt educated on attending to L UE and providing tactile input to L UE with R UE  Barriers to Learning: [place]  REQUIRES OT FOLLOW UP: Yes  Activity Tolerance  Activity Tolerance: Patient Tolerated treatment well  Safety Devices  Safety Devices in place: Yes  Type of devices: Left in chair;Nurse notified;Call light within reach; Chair alarm in place;Gait belt           Patient Diagnosis(es): The primary encounter diagnosis was Fall, initial encounter.  Diagnoses of Neck pain, Low back pain without sciatica, unspecified back pain laterality, unspecified chronicity, and General weakness were also pertinent to this visit. has a past medical history of CAD (coronary artery disease) and Cerebral artery occlusion with cerebral infarction ([address] has no past surgical history on file. Restrictions  Restrictions/Precautions  Restrictions/Precautions: Fall Risk  Position Activity Restriction  Other position/activity restrictions: L sided weakness, neglect L side    Subjective   General  Chart Reviewed: Yes, Orders, Progress Notes, History and Physical    Additional Pertinent Hx: Patient is a 59-year-old female with past medical history of CAD, hypertension, hypothyroidism who presents to the hospital after a fall at group home. According to the patient she was trying to take down and she fell to the ground. Patient did not feel lightheaded or dizzy before the event patient mentions she thinks like her legs have been weak and she has difficulty even sitting up. Patient denies any new numbness tingling. Patient endorses hitting the back of her head on the ground. Patient endorses generalized weakness difficulty ambulation. Patient denies chest pain shortness of breath nausea vomiting diarrhea constipation fevers chills. (copied per Dr Meño Faith, 12/5)  Family / Caregiver Present: No  Referring Practitioner: Mahin  Diagnosis: fall against object  Subjective  Subjective: Pt met bedside, agreeable to OT, reports pain in lower back.     Social/Functional History  Social/Functional History  Type of Home: (Independent Living at [facility] [place])  Home Layout: One level  Home Access: Level entry  Bathroom Shower/Tub: Walk-in shower  Bathroom Toilet: Handicap height  Bathroom Equipment: Built-in shower seat, Grab bars in shower  ADL Assistance: [address]: (has cleaning lady 1x/month)  Ambulation Assistance: Independent(RW)  Transfer Assistance: Independent  Active : No  Additional Comments: Denies any recent falls. Objective   Hearing: Exceptions to WFL(Pt reports she wears hearing aids, but [facility])  Hearing Exceptions: Hard of hearing/hearing concerns    Orientation  Overall Orientation Status: Within Functional Limits     Balance  Sitting Balance: Maximum assistance(pt leans to L side when sitting and requires cues to sit upright)  Standing Balance: Maximum assistance(Max A x2)  Standing Balance  Time: ~30 sec  Activity: hygiene after toileting and managing brief standing in helio stedy  Comment: Pt stood in [facility] stedy with Max Ax2 - pt has heavy posterior lean when standing  Functional Mobility  Functional - Mobility Device: Other(helio stedy)  Activity: To/from bathroom  Assist Level: Dependent/Total(2 person assist - 1 to manuever stedy and 1 to assist with sitting balance)  Functional Mobility Comments: Pt dependentx2 for fx mobility at this time d/t use of helio stedy  Toilet Transfers  Toilet - Technique: (helio stedy)  Toilet Transfer: Dependent/Total;2 Person assistance  Toilet Transfers Comments: Pt completed transfers to/from comfort height commode dependentx2 with helio stedy for safety d/t decreased sitting balance  Wheelchair Bed Transfers  Wheelchair/Bed - Technique: (helio stedy)  Equipment Used: Bed;Other(recliner)  Level of Asssistance: Dependent/Total;2 Person assistance  Wheelchair Transfers Comments: Pt completed transfers dependentx2 with helio stedy for safety d/t decreased sitting balance  ADL  Grooming: Minimal assistance(Pt completed oral care and washed face while seated in recliner. OT prepared toothbrush and washcloth for pt.)  Toileting: Dependent/Total(Assist to manage brief down/up, urinated on commode, assist for hygiene after urinating while standing in stedy.)  Additional Comments: Pure wick removed.  Anticipate pt will require Min A for feeding, Total A for bathing, Max A for UB dressing, and Total A for LB dressing. Tone RUE  RUE Tone: Normotonic  Tone LUE  LUE Tone: Normotonic  Coordination  Movements Are Fluid And Coordinated: Yes     Bed mobility  Supine to Sit: Moderate assistance;2 Person assistance;Maximum assistance(Assist for trunk to sit upright on EOB and assist to bring BLEs to floor)  Sit to Supine: Unable to assess  Comment: hospital bed with rail and HOB slightly elevated  Transfers  Sit to stand: Maximum assistance;2 Person assistance  Stand to sit: Maximum assistance;2 Person assistance  Transfer Comments: Pt completed several sit <> stand transfers with Max A x2 to/from helio perez     Cognition  Overall Cognitive Status: Exceptions  Arousal/Alertness: Delayed responses to stimuli  Following Commands: Follows one step commands with repetition; Follows one step commands with increased time  Attention Span: Attends with cues to redirect; Difficulty attending to directions  Memory: Decreased short term memory;Decreased recall of recent events  Safety Judgement: Decreased awareness of need for safety  Problem Solving: Decreased awareness of errors;Assistance required to generate solutions;Assistance required to identify errors made;Assistance required to implement solutions;Assistance required to correct errors made  Insights: Not aware of deficits  Initiation: Requires cues for some  Sequencing: Requires cues for some  Cognition Comment: Pt required cues to initiate and sequence through tasks  Perception  Unilateral Attention: Cues to maintain midline in sitting;Cues to maintain midline in standing;Cues to attend to left side of body;Cues to attend left visual field     Sensation  Overall Sensation Status: Impaired(decreased sensation to touch on L UE)        LUE PROM (degrees)  LUE General PROM: ~120* shoulder flex  LUE AROM (degrees)  LUE General AROM: ~60* shoulder flex, elbow flex/ext WFL, wrist flex/ext to neutral  Left Hand AROM (degrees)  Left Hand AROM: WFL  RUE AROM (degrees)  RUE General AROM: ~100* shoulder flex, WFL distally  Right Hand AROM (degrees)  Right Hand AROM: WFL  LUE Strength  LUE Strength Comment: shoulder flex not formally tested d/t limited AROM -- WFL distally  RUE Strength  Gross RUE Strength: WFL                   Plan   Plan  Times per week: 3-5  Times per day: Daily  Current Treatment Recommendations: Strengthening, Endurance Training, Patient/Caregiver Education & Training, Neuromuscular Re-education, ROM, Equipment Evaluation, Education, & procurement, Balance Training, Functional Mobility Training, Self-Care / ADL, Safety Education & Training, Positioning                                                    AM-PAC Score        AM-[facility] Inpatient Daily Activity Raw Score: 10 (12/07/20 1113)  AM-PAC Inpatient ADL T-Scale Score : 27.31 (12/07/20 1113)  ADL Inpatient CMS 0-100% Score: 74.7 (12/07/20 1113)  ADL Inpatient CMS G-Code Modifier : CL (12/07/20 1113)    Goals  Short term goals  Time Frame for Short term goals: by discharge  Short term goal 1: Pt will complete self care with Mod A and AD  Short term goal 2: Pt will complete fx transfers and mobility with Mod A and AD  Short term goal 3: Pt will increase activity tolerance to stand for 1 min for ADL task  Short term goal 4: Pt will sit EOB with Min A for balance  Short term goal 5: Pt will increase L UE function/awareness to assist in ADL tasks 25%. Long term goals  Time Frame for Long term goals : LTGs=STGs  Patient Goals   Patient goals : \"to be able to walk\" -- pt agreed to wanting to work on dressing       Therapy Time   Individual Concurrent Group Co-treatment   Time In 0900 0820   Time Out 0930 0900   Minutes 30     40   Timed Code Treatment Minutes: 55 Minutes(+15 min eval)      Therapist was present, directed the patient's care, made skilled judgement, and was responsible for assessment and treatment of the patient.           KEN Zepeda, OTR/L  #[id number] 12/7/20 11:24 AM

## 2020-12-07 NOTE — PROGRESS NOTES
Hospitalist Progress Note      PCP: Santiago Rincon MD    Date of Admission: 12/5/2020    Chief Complaint: left sided [facility] Course:     Subjective: left sided hemiparasis and dysmetria. States this is new since the fall. Medications:  Reviewed    Infusion Medications   Scheduled Medications    amLODIPine  5 mg Oral Daily    levothyroxine  25 mcg Oral QAM AC    lactobacillus  1 capsule Oral Daily with breakfast    psyllium  1 packet Oral Daily    sodium chloride flush  10 mL Intravenous 2 times per day    enoxaparin  40 mg Subcutaneous Nightly     PRN Meds: sodium chloride flush, potassium chloride, magnesium sulfate, acetaminophen **OR** acetaminophen, magnesium hydroxide, promethazine **OR** ondansetron, melatonin      Intake/Output Summary (Last 24 hours) at 12/6/2020 2009  Last data filed at 12/6/2020 1813  Gross per 24 hour   Intake 1180 ml   Output 900 ml   Net 280 ml       Exam:    /70   Pulse 90   Temp 98.5 °F (36.9 °C) (Oral)   Resp 18   Wt 119 lb 4.3 oz (54.1 kg)   SpO2 98%   BMI 21.81 kg/m²     General appearance: No apparent distress, appears stated age and cooperative. HEENT: Pupils equal, round, and reactive to light. Conjunctivae/corneas clear. Neck: Supple, with full range of motion. No jugular venous distention. Trachea midline. Respiratory:  Normal respiratory effort. Clear to auscultation, bilaterally without Rales/Wheezes/Rhonchi. Cardiovascular: Regular rate and rhythm with normal S1/S2 without murmurs, rubs or gallops. Abdomen: Soft, non-tender, non-distended with normal bowel sounds. Musculoskeletal: No clubbing, cyanosis or edema bilaterally. Full range of motion without deformity. Skin: Skin color, texture, turgor normal.  No rashes or lesions. Neurologic:  Neurovascularly intact without any focal sensory/motor deficits except left upper and lower extremity weakness and dysmetria.  Cranial nerves: II-XII intact, grossly non-focal except mild left facial droop. Psychiatric: Alert and oriented, thought content appropriate, normal insight  Capillary Refill: Brisk,< 3 seconds   Peripheral Pulses: +2 palpable, equal bilaterally       Labs:   Recent Labs     12/05/20  1324 12/06/20  0901   WBC 5.4 6.0   HGB 13.6 14.4   HCT 40.7 43.3   * 134*     Recent Labs     12/05/20  1324 12/06/20  0901   * 135*   K 4.1 3.6   CL 99 100   CO2 26 23   BUN 17 12   CREATININE 0.8 0.6   CALCIUM 9.4 9.1     Recent Labs     12/05/20  1324   AST 24   ALT 16   BILITOT 0.4   ALKPHOS 72     No results for input(s): INR in the last 72 hours. Recent Labs     12/05/20  1324   TROPONINI <0.01       Urinalysis:      Lab Results   Component Value Date    NITRU Negative 12/05/2020    WBCUA 1 12/05/2020    RBCUA 1 12/05/2020    BLOODU Negative 12/05/2020    SPECGRAV 1.007 12/05/2020    GLUCOSEU Negative 12/05/2020       Radiology:  XR CHEST PORTABLE   Final Result   No active cardiopulmonary disease         CT Head WO Contrast   Final Result   No acute intracranial abnormality. Diffuse atrophic changes with findings suggesting chronic microvascular   ischemia         CT Cervical Spine WO Contrast   Final Result   No acute abnormality of the cervical spine. CT Lumbar Spine WO Contrast   Final Result   1. No acute lumbar fracture. 2. Grade 1 degenerative anterolisthesis L4 on L5.   3. Bones appear osteoporotic. 4. Mild-to-moderate degenerative changes of the lumbar spine commensurate   with the patient's age. There is resultant mild lumbar spinal canal stenosis   L4-5. 5. Prominent calcific atherosclerosis distal aorta with narrowing of the   lumen, incompletely characterized without IV contrast enhancement.          MRI BRAIN WO CONTRAST    (Results Pending)   MRA HEAD WO CONTRAST    (Results Pending)   MRA NECK W WO CONTRAST    (Results Pending)           Assessment/Plan:    Active Hospital Problems    Diagnosis Date Noted    Fall against object Deloris Raymond 12/05/2020       Suspected acute stroke:  - left sided hemiparesis and dysmetria, states it is new since her fall  - Head CT unremarkable  - Neuro consulted:  Started on statin and aspirin  - MRI brain and MRA neck ordered, Echo ordered    Fall:  - suspect likely due to acute stroke  - PT, OT following, no acute fractures on CT    DVT Prophylaxis: Lovenox  Diet: DIET GENERAL;  Code Status: Full Code    PT/OT Eval Status: Would benefit from acute rehab consult    Dispo - MedSurg/PCU with telemetry    Zane Oliva MD

## 2020-12-07 NOTE — PROGRESS NOTES
Progress Note    Updates  No significant events overnight. Still w/ L sided weakness. Active Ambulatory Problems     Diagnosis Date Noted    Benign paroxysmal positional vertigo 11/18/2013    Chronic kidney disease, stage II (mild) 08/15/2013    Coronary atherosclerosis 08/04/2010    Hypertension 08/04/2010    Hypothyroid 08/15/2013    Insomnia 11/02/2011    Vasovagal syncope 02/08/2017    Vitamin D deficiency 08/15/2013    Old myocardial infarction 08/23/2010    Depression 12/30/2010    Frailty syndrome in geriatric patient 07/02/2019    Debility 07/02/2019    Frequent UTI 07/02/2019    Lumbar radiculopathy 08/29/2019    Urinary incontinence 01/09/2020     Past Medical History:   Diagnosis Date    CAD (coronary artery disease)     Cerebral artery occlusion with cerebral infarction [facility])      History reviewed. No pertinent surgical history.     Current Facility-Administered Medications:     sodium chloride flush 0.9 % injection 10 mL, 10 mL, Intravenous, 2 times per day, Minal Fogo, APRN - CNP    sodium chloride flush 0.9 % injection 10 mL, 10 mL, Intravenous, PRN, Minal Fogo, APRN - CNP    polyethylene glycol (GLYCOLAX) packet 17 g, 17 g, Oral, Daily PRN, Minal Fogo, APRN - CNP    promethazine (PHENERGAN) tablet 12.5 mg, 12.5 mg, Oral, Q6H PRN **OR** ondansetron (ZOFRAN) injection 4 mg, 4 mg, Intravenous, Q6H PRN, Minal Fogo, APRN - CNP    aspirin EC tablet 81 mg, 81 mg, Oral, Daily **OR** aspirin suppository 300 mg, 300 mg, Rectal, Daily, Minal Fogo, APRN - CNP    atorvastatin (LIPITOR) tablet 80 mg, 80 mg, Oral, Nightly, Minal Fogo, APRN - CNP    amLODIPine (NORVASC) tablet 5 mg, 5 mg, Oral, Daily, Katarzyna Campoverde MD, 5 mg at 12/06/20 0912    levothyroxine (SYNTHROID) tablet 25 mcg, 25 mcg, Oral, QAM AC, Donavan Stockton MD, 25 mcg at 12/07/20 0600    lactobacillus (CULTURELLE) capsule 1 capsule, 1 capsule, Oral, Daily with breakfast, Katarzyna Campoverde MD, 1 capsule at 12/06/20 0907    psyllium (KONSYL) 28.3 % packet 1 packet, 1 packet, Oral, Daily, Vivian Andrew MD, Stopped at 12/06/20 0909    potassium chloride 10 mEq/100 mL IVPB (Peripheral Line), 10 mEq, Intravenous, PRN, Vivian Andrew MD    magnesium sulfate 2 g in 50 mL IVPB premix, 2 g, Intravenous, PRN, Vivian Andrew MD    acetaminophen (TYLENOL) tablet 650 mg, 650 mg, Oral, Q6H PRN, 650 mg at 12/06/20 2029 **OR** acetaminophen (TYLENOL) suppository 650 mg, 650 mg, Rectal, Q6H PRN, Vivian Andrew MD    magnesium hydroxide (MILK OF MAGNESIA) 400 MG/5ML suspension 30 mL, 30 mL, Oral, Daily PRN, Vivian Andrew MD    enoxaparin (LOVENOX) injection 40 mg, 40 mg, Subcutaneous, Nightly, Donavan Stockton MD, 40 mg at 12/06/20 2028    melatonin tablet 6 mg, 6 mg, Oral, Nightly PRN, MARY LOU Melendez - CNP, 6 mg at 12/06/20 2029    Exam  Blood pressure (!) 152/73, pulse 84, temperature 97.4 °F (36.3 °C), temperature source Oral, resp. rate 16, weight 119 lb 0.8 oz (54 kg), SpO2 95 %, not currently breastfeeding. Constitutional                          Vital signs: BP, HR, and RR reviewed            General alert, no distress  Eyes: unable to visualize the fundi  Cardiovascular: pulses symmetric in all 4 extremities. Psychiatric: cooperative with examination, no psychotic behavior noted. Neurologic  Mental status:   orientation to person, place                Attention intact as able to attend well to the exam                Language fluent in conversation              Comprehension intact; follows simple commands  Cranial nerves:   CN2: visual fields full  CN 3,4,6: extraocular muscles intact  CN7: face symmetric without dysarthria  CN8: a bit hard of hearing. CN11: trap strength decreased on L.    CN12: tongue midline with protrusion  Strength: LUE/LLE weakness. Sensory: light touch intact in all 4 extremities. Cerebellar/coordination: finger nose finger difficult to assess given weakness.     Tone: normal in

## 2020-12-08 ENCOUNTER — APPOINTMENT (OUTPATIENT)
Dept: GENERAL RADIOLOGY | Age: 85
DRG: 065 | End: 2020-12-08
Payer: MEDICARE

## 2020-12-08 ENCOUNTER — APPOINTMENT (OUTPATIENT)
Dept: MRI IMAGING | Age: 85
DRG: 065 | End: 2020-12-08
Payer: MEDICARE

## 2020-12-08 LAB
ANION GAP SERPL CALCULATED.3IONS-SCNC: 9 MMOL/L (ref 3–16)
BUN BLDV-MCNC: 20 MG/DL (ref 7–20)
CALCIUM SERPL-MCNC: 8.8 MG/DL (ref 8.3–10.6)
CHLORIDE BLD-SCNC: 100 MMOL/L (ref 99–110)
CO2: 25 MMOL/L (ref 21–32)
CREAT SERPL-MCNC: 0.6 MG/DL (ref 0.6–1.2)
ESTIMATED AVERAGE GLUCOSE: 125.5 MG/DL
GFR AFRICAN AMERICAN: >60
GFR NON-AFRICAN AMERICAN: >60
GLUCOSE BLD-MCNC: 97 MG/DL (ref 70–99)
HBA1C MFR BLD: 6 %
HCT VFR BLD CALC: 42.8 % (ref 36–48)
HEMOGLOBIN: 14.2 G/DL (ref 12–16)
MCH RBC QN AUTO: 29 PG (ref 26–34)
MCHC RBC AUTO-ENTMCNC: 33.3 G/DL (ref 31–36)
MCV RBC AUTO: 87.2 FL (ref 80–100)
PDW BLD-RTO: 14.8 % (ref 12.4–15.4)
PLATELET # BLD: 123 K/UL (ref 135–450)
PMV BLD AUTO: 9.3 FL (ref 5–10.5)
POTASSIUM REFLEX MAGNESIUM: 3.9 MMOL/L (ref 3.5–5.1)
RBC # BLD: 4.91 M/UL (ref 4–5.2)
SODIUM BLD-SCNC: 134 MMOL/L (ref 136–145)
WBC # BLD: 6.6 K/UL (ref 4–11)

## 2020-12-08 PROCEDURE — A9577 INJ MULTIHANCE: HCPCS | Performed by: NURSE PRACTITIONER

## 2020-12-08 PROCEDURE — 70549 MR ANGIOGRAPH NECK W/O&W/DYE: CPT

## 2020-12-08 PROCEDURE — 6360000002 HC RX W HCPCS: Performed by: INTERNAL MEDICINE

## 2020-12-08 PROCEDURE — 74230 X-RAY XM SWLNG FUNCJ C+: CPT

## 2020-12-08 PROCEDURE — 2580000003 HC RX 258: Performed by: NURSE PRACTITIONER

## 2020-12-08 PROCEDURE — 80048 BASIC METABOLIC PNL TOTAL CA: CPT

## 2020-12-08 PROCEDURE — 1200000000 HC SEMI PRIVATE

## 2020-12-08 PROCEDURE — 6360000004 HC RX CONTRAST MEDICATION: Performed by: NURSE PRACTITIONER

## 2020-12-08 PROCEDURE — 6370000000 HC RX 637 (ALT 250 FOR IP): Performed by: NURSE PRACTITIONER

## 2020-12-08 PROCEDURE — 92526 ORAL FUNCTION THERAPY: CPT

## 2020-12-08 PROCEDURE — 97530 THERAPEUTIC ACTIVITIES: CPT

## 2020-12-08 PROCEDURE — 92611 MOTION FLUOROSCOPY/SWALLOW: CPT

## 2020-12-08 PROCEDURE — 36415 COLL VENOUS BLD VENIPUNCTURE: CPT

## 2020-12-08 PROCEDURE — 97535 SELF CARE MNGMENT TRAINING: CPT

## 2020-12-08 PROCEDURE — 85027 COMPLETE CBC AUTOMATED: CPT

## 2020-12-08 PROCEDURE — 70544 MR ANGIOGRAPHY HEAD W/O DYE: CPT

## 2020-12-08 PROCEDURE — 6370000000 HC RX 637 (ALT 250 FOR IP): Performed by: INTERNAL MEDICINE

## 2020-12-08 PROCEDURE — 97112 NEUROMUSCULAR REEDUCATION: CPT

## 2020-12-08 PROCEDURE — 94760 N-INVAS EAR/PLS OXIMETRY 1: CPT

## 2020-12-08 PROCEDURE — 97129 THER IVNTJ 1ST 15 MIN: CPT

## 2020-12-08 PROCEDURE — 70551 MRI BRAIN STEM W/O DYE: CPT

## 2020-12-08 RX ADMIN — LEVOTHYROXINE SODIUM 25 MCG: 0.03 TABLET ORAL at 05:24

## 2020-12-08 RX ADMIN — ASPIRIN 81 MG: 81 TABLET, COATED ORAL at 10:57

## 2020-12-08 RX ADMIN — Medication 6 MG: at 20:41

## 2020-12-08 RX ADMIN — Medication 1 CAPSULE: at 10:57

## 2020-12-08 RX ADMIN — ENOXAPARIN SODIUM 40 MG: 40 INJECTION SUBCUTANEOUS at 20:41

## 2020-12-08 RX ADMIN — SODIUM CHLORIDE, PRESERVATIVE FREE 10 ML: 5 INJECTION INTRAVENOUS at 10:57

## 2020-12-08 RX ADMIN — ACETAMINOPHEN 650 MG: 325 TABLET ORAL at 20:41

## 2020-12-08 RX ADMIN — AMLODIPINE BESYLATE 5 MG: 5 TABLET ORAL at 10:57

## 2020-12-08 RX ADMIN — ACETAMINOPHEN 650 MG: 325 TABLET ORAL at 13:44

## 2020-12-08 RX ADMIN — SODIUM CHLORIDE, PRESERVATIVE FREE 10 ML: 5 INJECTION INTRAVENOUS at 22:19

## 2020-12-08 RX ADMIN — GADOBENATE DIMEGLUMINE 10 ML: 529 INJECTION, SOLUTION INTRAVENOUS at 09:53

## 2020-12-08 RX ADMIN — ATORVASTATIN CALCIUM 80 MG: 80 TABLET, FILM COATED ORAL at 20:41

## 2020-12-08 ASSESSMENT — PAIN DESCRIPTION - FREQUENCY
FREQUENCY: INTERMITTENT
FREQUENCY: INTERMITTENT

## 2020-12-08 ASSESSMENT — PAIN DESCRIPTION - LOCATION
LOCATION: BACK;NECK
LOCATION: NECK

## 2020-12-08 ASSESSMENT — PAIN DESCRIPTION - DIRECTION: RADIATING_TOWARDS: BACK

## 2020-12-08 ASSESSMENT — PAIN DESCRIPTION - DESCRIPTORS
DESCRIPTORS: ACHING
DESCRIPTORS: ACHING

## 2020-12-08 ASSESSMENT — PAIN DESCRIPTION - PAIN TYPE
TYPE: ACUTE PAIN
TYPE: ACUTE PAIN

## 2020-12-08 ASSESSMENT — PAIN DESCRIPTION - ONSET
ONSET: GRADUAL
ONSET: GRADUAL

## 2020-12-08 ASSESSMENT — PAIN DESCRIPTION - PROGRESSION
CLINICAL_PROGRESSION: NOT CHANGED
CLINICAL_PROGRESSION: GRADUALLY IMPROVING

## 2020-12-08 ASSESSMENT — PAIN SCALES - GENERAL
PAINLEVEL_OUTOF10: 4
PAINLEVEL_OUTOF10: 1
PAINLEVEL_OUTOF10: 0
PAINLEVEL_OUTOF10: 0
PAINLEVEL_OUTOF10: 3

## 2020-12-08 NOTE — PROGRESS NOTES
Physical Therapy  Facility/Department: [address] [facility]  Daily Treatment Note  NAME: Lauri Alamo  : 3/18/1931  MRN: [id number]    Date of Service: 2020    Discharge Recommendations:  Patient would benefit from continued therapy after discharge, 3-5 sessions per week       Lauri Alamo scored a / on the AM-PAC short mobility form. Current research shows that an AM-PAC score of 17 or less is typically not associated with a discharge to the patient's home setting. Based on the patient's AM-PAC score and their current functional mobility deficits, it is recommended that the patient have 3-5 sessions per week of Physical Therapy at d/c to increase the patient's independence. Please see assessment section for further patient specific details. If patient discharges prior to next session this note will serve as a discharge summary. Please see below for the latest assessment towards goals. Assessment   Body structures, Functions, Activity limitations: Decreased functional mobility ; Decreased coordination;Decreased strength;Decreased fine motor control  Assessment: Patient is a 77-year-old female with past medical history of CAD, hypertension, hypothyroidism who presents to the hospital after a fall at [facility]. Pt report she doesn't know how she fell, but now she has a tough time moving her left side. Pt reports she was independent with all functional mobility prior to admission, using a RW for ambulation prior to admission. 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 She reports she feels she is actually leaning to the right. Patient was max assist of 2 for all mobility and requires encouragement to try and do any activities for herself. She frequently states, \"I can't. \" She also states, \"I can't find my mouth,\" but was able to brush her teeth, bring mouthwash to her mouth, and use fork and spoon to bring food to her mouth.   Patient with tone in General  Chart Reviewed: Yes  Additional Pertinent Hx: Patient is a 77-year-old female with past medical history of CAD, hypertension, hypothyroidism who presents to the hospital after a fall at [facility]. Pt report she doesn't know how she fell, but now she has a tough time moving her left side. Response To Previous Treatment: Not applicable;Patient with no complaints from previous session. Family / Caregiver Present: No  Referring Practitioner: Renetta French MD  Subjective  Subjective: Patient recently back from MRI and MRA studies. She was supine in bed and reporting she needed to urinate. Patient not understanding why a purewick isn't being used all the time. PT explained importance of getting up frequently. She is very [place] and has great difficulty understanding what the therapists are saying to her. General Comment  Comments: MRI shows acute right thalamic infarction            Objective   Bed mobility  Rolling to Left: Maximum assistance  Supine to Sit: Moderate assistance(mod assist of two.)  Sit to Supine: Unable to assess(left up in chair)  Scooting: Maximal assistance(max assist to scoot to edge of the bed)  Comment: Patient frequently saying \"I can't\" and she required encouragement to try and do as much on her own. Transfers  Sit to Stand: Maximum Assistance;2 Person Assistance(2 person assist as patient has strong push to the left. patient is unaware of this and requires max assist to prevent fall to the left)  Stand to sit: Maximum Assistance;2 Person Assistance(pushing to the left)  Bed to Chair: Dependent/Total(Stedy used secondary to poor balance and strong push with right UE)  Comment: Patient now presenting with pushers syndrome and feels she is leaning to the right despite clear lean to the left. Attempted use of mirror to show lean, but patient stated she could not see it.     Ambulation  Ambulation?: No(unable to advance walker or coordinate LLE) Balance  Posture: Poor  Sitting - Static: Poor(posterior left lean in sitting and required min-mod assist to prevent falling back. Encouraged a lean to the right but patient then pushes with right UE.)  Sitting - Dynamic: Poor  Standing - Static: Poor(strong lean to the left with pushing with right LE to the left. She needs encouragement to straighten left LE.)    Exercises  Hip Flexion: x10 with min assist, fatigues quickly  Hip Abduction: x10 reps PROM  Knee Long Arc Quad: x10 with encouragement to do on her own but assist was given to achieve full knee extension and increase knee flexion. Ankle Pumps: x10 with mod assist  Comments: ther ex done to left LE while seated supported in recliner. Comment: patient reporting she needed to urinate. She did not want to go to the bathroom to use the commode, but was agreeable with encouragement. She was max assist of 2 to  the stedy, and required min assist to maintain upright while seated on platform of walker secondary to pushing to the left. Patient was max assist of two to sit back onto commode and to stand back up. Patient with increased pushing to the left and was unable to follow cues to lean to the right when standing for mariposa care. Patient was set up in the recliner chair with breakfast at hand. She stated she couldn't fed herself, but therapists encouraged her to try and feed herself. Patient was able to scoop food on spoon and use fork to  Sloven[place] toast. Also able to bring food to her mouth, but she required encouragement to do all this on her own as she frequently states, \"I can't do it. \"      AM-PAC Score  AM-PAC Inpatient Mobility Raw Score : 6 (12/08/20 1058)  AM-PAC Inpatient T-Scale Score : 23.55 (12/08/20 1058)  Mobility Inpatient CMS 0-100% Score: 100 (12/08/20 1058)  Mobility Inpatient CMS G-Code Modifier : CN (12/08/20 1058)          Goals  Short term goals  Time Frame for Short term goals: by acute discharge (goals modified 12/8 secondary to new CVA found)  Short term goal 1: bed mobility with mod assist of one  Short term goal 2: transfers with mod assist of one  Short term goal 3: Stand with bilateral UE support on bar with min assist of one for 1 minute  Short term goal 4: add propel w/c 100' with SBA/CGA  Long term goals  Time Frame for Long term goals : STG = LTG  Patient Goals   Patient goals : get well    Plan    Plan  Times per week: 3-5x/week  Current Treatment Recommendations: Functional Mobility Training  Plan Comment: co treat with OT  Safety Devices  Type of devices:  All fall risk precautions in place, Call light within reach, Gait belt, Left in chair, Chair alarm in place, Patient at risk for falls, Nurse notified(RN, Kika Reynoso, and PCA, Araceli notified)     Therapy Time   Individual Concurrent Group Co-treatment   Time In 1000         Time Out 1055         Minutes 55                 Electronically signed by Megan Christensen, PT on 12/8/2020 at 10:59 AM

## 2020-12-08 NOTE — PROGRESS NOTES
[facility]   Speech Therapy  Daily Dysphagia Treatment Note        J Carlos Osorio  AGE: 80 y.o. GENDER: female  : 3/18/1931  [id number]  EPISODE DATE:  2020  Patient Active Problem List   Diagnosis    Benign paroxysmal positional vertigo    Chronic kidney disease, stage II (mild)    Coronary atherosclerosis    Hypertension    Hypothyroid    Insomnia    Vasovagal syncope    Vitamin D deficiency    Old myocardial infarction    Depression    Frailty syndrome in geriatric patient    Debility    Frequent UTI    Lumbar radiculopathy    Urinary incontinence    Fall against object    Ischemic stroke (HCC)     Allergies   Allergen Reactions    Sulfa Antibiotics     Trazodone     Sulfamethoxazole-Trimethoprim Nausea Only and Rash     Treatment Diagnosis: Dysphagia       Chart review:   HPI per neuro:  Yandy Nunes a 80 y.o. female who presents with a fall. The patient lives at [facility]. She carries a diagnosis of gait apraxia and uses a walker.  Does have a documented hx of both dizziness/vertigo as well.  Also w/ old lacunar strokes (?hemorrhagic stroke)), though not sure of any chronic focal neurologic deficit. She says yesterday morning she felt fine.  Around 1100 she was at her desk and when she stood up she essentially just collapsed to the ground.  No reported symptoms preceding the event.  No LOC. She says that she could not get herself up and felt like everything below the waist was not working. Matilde Rhoades called staff at [facility] who then called EMS in order to have her transported to the ED to be evaluated. Initial BP was 162/83.  No fever.  CT head was w/out any acute findings.  No acute fracture on CT cervical/lumbar spine as she was complaining of back pain.  No focal neurologic deficits were noted in the ED.  Apparently she could not stand up.  Currently, she clearly has L sided deficits.  Highly suspicious for acute stroke   2020 MRI Brain:  Impression Acute infarction in the right thalamus.         Old infarctions in the bilateral basal ganglia and bilateral thalami.         Curvilinear susceptibility artifacts in the right frontal lobe and bilateral    basal ganglia, likely related to sequela of old hemorrhage.  Scattered tiny    foci of susceptibility artifacts, likely related to old petechial hemorrhage    or small cavernomas versus amyloid angiopathy.         Mild to moderate parenchymal volume loss.         Mild to moderate chronic microvascular disease. Subjective:     Current diet  Dysphagia minced and moist  Mildly nectar thick liquids    Comments regarding tolerating Current Diet:   Nurse reports pt appears to be tolerating without complication    Objective:     Pain   Patient Currently in Pain: Denies    Cognitive/Behavior   Behavior/Cognition: Alert, Cooperative, Confused    Presentations   Consistencies Administered: Dysphagia Minced and Moist (Dysphagia II), Dysphagia Pureed (Dysphagia I), Thin - straw, Nectar - straw    Positioning    upright in chair    PO Trials:  · Thin Liquids NT  · Nectar thick liquids cup: decreased bolus control; suspected premature bolus loss; delayed swallow; decreased laryngeal elevation; inconsistent cough  · Honey Thick liquids cup:decreased bolus control; suspected premature bolus loss; delayed swallow; decreased laryngeal elevation; inconsistent cough  · Puree:decreased bolus control; oral residue; suspected premature bolus loss; delayed swallow; decreased laryngeal elevation  · Mince/moist:decreased bolus control; oral residue; suspected premature bolus loss; delayed swallow; decreased laryngeal elevation  · Soft food NT  · Regular food NT    Dysphagia Tx:   · Alert, able to self-feed with right hand and occasional hand over hand for steadying  · Oriented to self, age, location, time, partially to situation. Spatial awareness, self-monitoring/regulation, and insight appear variable. · PO trials as described. Inconsistent cough with nectar and honey liquids. Appears at risk for potential aspiration during and after the swallow, evidenced by multiple swallows per bolus with delayed cough after last swallow completed. · MBS recommended to fully assess; verbal order obtained from neuro NP. Will plan to complete this afternoon. Pt educated and in agreement. Goals:   Dysphagia Goals: The patient will tolerate recommended diet without observed clinical signs of aspiration continue; variable s/s  The patient will improve oral motor function via therapeutic oral motor exercises to 5/5 each trial. Not addressed   The patient/caregiver will demonstrate understanding of compensatory strategies for improved swallowing safety. Ongoing  The patient will tolerate mechanical soft foods 10/10. Ongoing  The patient will tolerate thin liquids without signs and symptoms of aspiration 10/10 via straw. Not addressed  The pt will participate with instrumental assessment of swallowing: order obtained and scheudled for 130 today    Assessment:   Impressions:   Dysphagia Diagnosis: Mild to moderate oral stage dysphagia, Mild to moderate pharyngeal stage dysphagia, suspected needs further assessment  Pt alert, cooperative but requires cues d/t hard of hearing; follows simple commands and is oriented x3.     · Suspected moderate oropharyngeal dysphagia characterized by left sided labial and lingual weakness, and decreased lingual coordination affecting bolus formation and control, decreased AP transit with high suspicion for premature bolus loss to pharynx, delayed swallow initiation, and decreased laryngeal elevation. · Inconsistent variable cough after nectar and honey liquids    · Variable minimal to mild oral residue with puree and minced/moist texture, with variable pt awareness to clear. Residue is cleared with liquid wash. · Recommend Modified Barium Swallow to fully assess pharyngeal phase.   Order obtained and plan to complete today. Diet Recommendations:  Minced/moist pending MBS  Nectar thick pending MBS  Recommended Form of Meds: Meds in puree    Strategies:   Compensatory Swallowing Strategies: Upright as possible for all oral intake, Remain upright for 30-45 minutes after meals, Eat/Feed slowly, Assist feed, Small bites/sips, Swallow 2 times per bite/sip    Education:  Consulted and agree with results and recommendations: Patient, RN  Patient Education: results, recommendations  Patient Education Response: Verbalizes understanding, Needs reinforcement    Prognosis:   good    Plan:     Continue Dysphagia Therapy: yes  Interventions: Therapeutic Interventions: Diet tolerance monitoring, Therapeutic PO trials with SLP, Patient/Family education, Oral motor exercises, Bolus control exercises, Pharyngeal exercises, Laryngeal exercises  Duration/Frequency of therapy while on unit: Duration/Frequency of Treatment  Duration/Frequency of Treatment: 3-5x/wk while on acute unit  Discharge Instructions:   Anticipate Yes for further skilled Speech Therapy for Dysphagia at discharge    This note serves as a D/C Summary in the event that this patient is discharged prior to the next therapy session.     Coded treatment time:10  Total treatment time: 30    Electronically signed by  Carolyn Coy MS, CCC-SLP #[id number]  Speech Language Pathologist  on 12/8/2020 at 121 PM

## 2020-12-08 NOTE — PROGRESS NOTES
Occupational Therapy  Facility/Department: [address] IP RE[facility]  Daily Treatment Note  NAME: Thuy Calderón  : 3/18/1931  MRN: [id number]    Date of Service: 2020    Discharge Recommendations:  Continue to assess pending progress, 3-5 sessions per week  OT Equipment Recommendations  Other: TBD  Thuy Calderón scored a 10/24 on the AM-PAC ADL Inpatient form. Current research shows that an AM-PAC score of 17 or less is typically not associated with a discharge to the patient's home setting. Based on the patient's AM-PAC score and their current ADL deficits, it is recommended that the patient have 3-5 sessions per week of Occupational Therapy at d/c to increase the patient's independence. Please see assessment section for further patient specific details. If patient discharges prior to next session this note will serve as a discharge summary. Please see below for the latest assessment towards goals. Assessment   Performance deficits / Impairments: Decreased functional mobility ; Decreased endurance;Decreased ADL status; Decreased sensation;Decreased ROM; Decreased balance;Decreased strength;Decreased vision/visual deficit  Assessment: pt seen for OT/PT co-tx to address sitting & standing balance, household t/f, grooming & toileting tasks. She required mod/max A of 2 for bed mobility, heavy leaning to L & posteriorly. Required use of helio stedy w/ max A of 2 for sit<>stand transitions & to complete bed, toilet & recliner t/f. She is anxious, [place] and limited comprehension during instructions. Pt needed min/CG A w/ combing her hair, brushing her teeth & feeding herself. Given hand over hand A for placement of L hand around cup to guide w/ R hand to bring to mouth.  Recommend cont OT/PT to address above stated deficits  Treatment Diagnosis: impaired ADLs, t/f  Prognosis: Fair  Decision Making: High Complexity  History: Patient is a 51-year-old female with past medical history of CAD, hypertension, hypothyroidism who presents to the hospital after a fall at group home. According to the patient she was trying to take down and she fell to the ground. Patient did not feel lightheaded or dizzy before the event patient mentions she thinks like her legs have been weak and she has difficulty even sitting up. Patient denies any new numbness tingling. Patient endorses hitting the back of her head on the ground. Patient endorses generalized weakness difficulty ambulation. Patient denies chest pain shortness of breath nausea vomiting diarrhea constipation fevers chills. (copied per Dr Rollin Schirmer, )  Exam: bed mobility, transfers, fx mobility, UE ROM/strength, ADLs  Assistance / Modification: helio perez for fx mobility/transfers -- anticipate up to Total A for ADLs at this time  OT Education: Orientation  Patient Education: pt educated on attending to L UE and providing tactile input to L UE with R UE  Barriers to Learnin W Lynda Del Angel, decreased comprehension  REQUIRES OT FOLLOW UP: Yes  Activity Tolerance  Activity Tolerance: Treatment limited secondary to decreased cognition;Patient limited by fatigue  Activity Tolerance: pt anxious, limited hearing & understanding of directions  Safety Devices  Type of devices: Left in chair;Nurse notified;Call light within reach; Chair alarm in place;Gait belt         Patient Diagnosis(es): The primary encounter diagnosis was Fall, initial encounter. Diagnoses of Neck pain, Low back pain without sciatica, unspecified back pain laterality, unspecified chronicity, and General weakness were also pertinent to this visit. has a past medical history of CAD (coronary artery disease) and Cerebral artery occlusion with cerebral infarction ([facility] [address] has a past surgical history that includes Hysterectomy ().     Restrictions  Restrictions/Precautions  Restrictions/Precautions: Fall Risk  Position Activity Restriction  Other position/activity restrictions: L sided weakness, neglect L side, pushers syndrome  Subjective   General  Chart Reviewed: Yes, Orders, Progress Notes, History and Physical  Patient assessed for rehabilitation services?: Yes  Additional Pertinent Hx: Patient is a 77-year-old female with past medical history of CAD, hypertension, hypothyroidism who presents to the hospital after a fall at group home. According to the patient she was trying to take down and she fell to the ground. Patient did not feel lightheaded or dizzy before the event patient mentions she thinks like her legs have been weak and she has difficulty even sitting up. Patient denies any new numbness tingling. Patient endorses hitting the back of her head on the ground. Patient endorses generalized weakness difficulty ambulation. Patient denies chest pain shortness of breath nausea vomiting diarrhea constipation fevers chills. (copied per Dr Elba Lomas, 12/5)  Family / Caregiver Present: No  Referring Practitioner: Mahin  Diagnosis: fall against object  Subjective  Subjective: met in room, pt resting in bed quietly, reports pain thru L side  General Comment  Comments: pre RN ok to for OT/PT co tx; pt asking to urinate      Orientation  Orientation  Overall Orientation Status: Within Functional Limits  Objective    ADL  Feeding: Contact guard assistance; Increased time to complete; Thickened liquids(nectar thick liquids, needed set up & prompting to feed herself; tried using L hand over hand A to bring cup to her mouth)  Grooming: Minimal assistance(needed min A to comb hair, not thoroughl set up & cues to brush her teeth & use mouthwash)  Toileting: Dependent/Total(required total A of 2 to manage clothing & [place]iposa hygiene; heavy lean to L while in stedy and seated on toilet)  Additional Comments: Anticipate pt will require Total A for bathing, Max A for UB dressing, and Total A for LB dressing.         Balance  Sitting Balance: Minimal assistance(once placed in midline, able to sit on EOB w/ min A to correct L lean & posterior LOB)  Standing Balance: Maximum assistance(max A of 1-2 using stedy)  Standing Balance  Time: less than 1 minute  Activity: hygiene after toileting and managing brief standing in helio perez  Comment: Pt stood in [facility] stedy with Max Ax2 - pt has heavy posterior & L sided lean when standing  Functional Mobility  Functional - Mobility Device: Other  Activity: To/from bathroom  Assist Level: Dependent/Total  Functional Mobility Comments: Pt max/total A x2 for fx mobility at this time d/t use of helio stedy  Toilet Transfers  Toilet Transfer: Dependent/Total;2 Person assistance  Toilet Transfers Comments: Pt completed transfers to/from comfort height commode dependentx2 with helio stedy for safety d/t decreased sitting balance, heavy lean to L & posteriorly  Wheelchair Bed Transfers  Equipment Used: Bed;Other  Level of Asssistance: Dependent/Total;2 Person assistance  Wheelchair Transfers Comments: Pt completed transfers dependentx2 with helio azucenady for safety d/t decreased sitting balance out of bed & into recliner  Bed mobility  Rolling to Left: Maximum assistance  Supine to Sit: Moderate assistance(mod assist of two.)  Sit to Supine: Unable to assess(left up in chair)  Scooting: Maximal assistance(max assist to scoot to edge of the bed)  Transfers  Stand to sit: Maximum assistance;2 Person assistance  Transfer Comments: Pt completed several sit <> stand transfers with Max A x2 to/from helio azucenady                       Cognition  Overall Cognitive Status: Exceptions  Arousal/Alertness: Delayed responses to stimuli  Following Commands: Follows one step commands with repetition; Follows one step commands with increased time  Attention Span: Attends with cues to redirect; Difficulty attending to directions  Memory: Decreased short term memory;Decreased recall of recent events  Safety Judgement: Decreased awareness of need for safety  Problem Solving: Decreased awareness of errors;Assistance required to generate solutions;Assistance required to identify errors made;Assistance required to implement solutions;Assistance required to correct errors made  Insights: Not aware of deficits  Initiation: Requires cues for some  Sequencing: Requires cues for some  Cognition Comment: Pt required cues to initiate and sequence through tasks, is [place] combined w/ limited comprehension     Perception  Unilateral Attention: Cues to maintain midline in sitting;Cues to maintain midline in standing;Cues to attend to left side of body;Cues to attend left visual field                                   Plan   Plan  Times per week: 3-5  Times per day: Daily  Specific instructions for Next Treatment: co-tx  Current Treatment Recommendations: Strengthening, Endurance Training, Patient/Caregiver Education & Training, Neuromuscular Re-education, ROM, Equipment Evaluation, Education, & procurement, Balance Training, Functional Mobility Training, Self-Care / ADL, Safety Education & Training, Positioning       AM-PAC Score        AM-PAC Inpatient Daily Activity Raw Score: 10 (12/08/20 1050)  AM-PAC Inpatient ADL T-Scale Score : 27.31 (12/08/20 1050)  ADL Inpatient CMS 0-100% Score: 74.7 (12/08/20 1050)  ADL Inpatient CMS G-Code Modifier : CL (12/08/20 1050)    Goals  Short term goals  Time Frame for Short term goals: by discharge  Short term goal 1: Pt will complete self care with Mod A and AD  Short term goal 2: Pt will complete fx transfers and mobility with Mod A and AD  Short term goal 3: Pt will increase activity tolerance to stand for 1 min for ADL task  Short term goal 4: Pt will sit EOB with Min A for balance  Short term goal 5: Pt will increase L UE function/awareness to assist in ADL tasks 25%.   Long term goals  Time Frame for Long term goals : LTGs=STGs  Patient Goals   Patient goals : \"to be able to walk\" -- pt agreed to wanting to work on dressing       Therapy Time   Individual Concurrent Group Co-treatment   Time In 1000         Time Out 1545 Angélica Moya         Timed Code Treatment Minutes: [address], [place] #[phone]

## 2020-12-08 NOTE — CARE COORDINATION
LSW received call from [address] [place] [address] for APPROVAL for SNF transfer when medically stable. Approval #  [id number]  Reference #  [id number]  Good starting today 12-8-2020 with update on 12-.   Pat Betancur [place]     Case Management   [phone]    12/8/2020  8:50 AM

## 2020-12-08 NOTE — PROGRESS NOTES
Hospitalist Progress Note      PCP: Urban Chau MD    Date of Admission: 12/5/2020    Chief Complaint:     Hospital Course: This is an 40-year-old female with a history of CAD and prior CVA, hyperlipidemia who presented to the ED with complaints of a fall that occurred at the nursing home. She goes on to describe leg weakness but denies dizziness or lightheadedness prior to the event. Neurology was consulted and following. MRI + for stroke. Having some difficulty swallowing, requiring MBS. Subjective:   Asked for purwick today so she didn't have to get up to use the bathroom. Still having difficult time moving LUE. Denies any fevers, chills, cp, sob, nausea or vomiting. Medications:  Reviewed    Infusion Medications   Scheduled Medications    sodium chloride flush  10 mL Intravenous 2 times per day    aspirin  81 mg Oral Daily    Or    aspirin  300 mg Rectal Daily    atorvastatin  80 mg Oral Nightly    amLODIPine  5 mg Oral Daily    levothyroxine  25 mcg Oral QAM AC    lactobacillus  1 capsule Oral Daily with breakfast    psyllium  1 packet Oral Daily    enoxaparin  40 mg Subcutaneous Nightly     PRN Meds: sodium chloride flush, polyethylene glycol, promethazine **OR** ondansetron, potassium chloride, magnesium sulfate, acetaminophen **OR** acetaminophen, magnesium hydroxide, melatonin      Intake/Output Summary (Last 24 hours) at 12/8/2020 1444  Last data filed at 12/8/2020 0021  Gross per 24 hour   Intake 370 ml   Output 1000 ml   Net -630 ml       Physical Exam Performed:    BP (!) 162/83   Pulse 99   Temp 97.2 °F (36.2 °C) (Oral)   Resp 13   Wt 119 lb 0.8 oz (54 kg)   SpO2 95%   BMI 21.77 kg/m²     General appearance: No apparent distress, appears stated age and cooperative. HEENT: Pupils equal, round, and reactive to light. Conjunctivae/corneas clear. Very hard of hearing  Neck: Supple, with full range of motion. Trachea midline.   Respiratory:  Normal respiratory effort. Clear to auscultation, bilaterally without Rales/Wheezes/Rhonchi. Cardiovascular: Regular rate and rhythm with normal S1/S2 without murmurs, rubs or gallops. Abdomen: Soft, non-tender, non-distended with normal bowel sounds. Musculoskeletal: No clubbing, cyanosis or edema bilaterally. Skin: Skin color, texture, turgor normal.  No rashes or lesions. Neurologic:  Neurovascularly intact without any focal sensory/motor deficits. Cranial nerves: II-XII intact, left upper extremity with gross ataxia and diminished motor response, still present, left lower extremity with diminished motor response and 3 out of 5 strength, all other extremities within normal limits  Psychiatric: Alert and oriented, thought content appropriate, normal insight  Capillary Refill: Brisk,< 3 seconds   Peripheral Pulses: +2 palpable, equal bilaterally       Labs:   Recent Labs     12/06/20  0901 12/07/20  0617 12/08/20  0629   WBC 6.0 5.3 6.6   HGB 14.4 14.5 14.2   HCT 43.3 42.7 42.8   * 130* 123*     Recent Labs     12/06/20  0901 12/07/20  0617 12/08/20  0629   * 137 134*   K 3.6 4.1 3.9    102 100   CO2 23 25 25   BUN 12 16 20   CREATININE 0.6 0.7 0.6   CALCIUM 9.1 9.0 8.8     No results for input(s): AST, ALT, BILIDIR, BILITOT, ALKPHOS in the last 72 hours. No results for input(s): INR in the last 72 hours. No results for input(s): Erica Hatch in the last 72 hours. Urinalysis:      Lab Results   Component Value Date    NITRU Negative 12/05/2020    WBCUA 1 12/05/2020    RBCUA 1 12/05/2020    BLOODU Negative 12/05/2020    SPECGRAV 1.007 12/05/2020    GLUCOSEU Negative 12/05/2020       Radiology:  MRA NECK W WO CONTRAST   Final Result   80% focal stenosis in the proximal P2 segment of the left PCA. 80% stenosis in the P3 segment of the right PCA. Unremarkable MRA of the neck. MRA HEAD WO CONTRAST   Final Result   80% focal stenosis in the proximal P2 segment of the left PCA.       80% stenosis in the P3 segment of the right PCA. Unremarkable MRA of the neck. MRI BRAIN WO CONTRAST   Final Result   Acute infarction in the right thalamus. Old infarctions in the bilateral basal ganglia and bilateral thalami. Curvilinear susceptibility artifacts in the right frontal lobe and bilateral   basal ganglia, likely related to sequela of old hemorrhage. Scattered tiny   foci of susceptibility artifacts, likely related to old petechial hemorrhage   or small cavernomas versus amyloid angiopathy. Mild to moderate parenchymal volume loss. Mild to moderate chronic microvascular disease. Results were reported to RN Claudene Perfect at 8:42 a.m. on December 8, 2020. XR CHEST PORTABLE   Final Result   No active cardiopulmonary disease         CT Head WO Contrast   Final Result   No acute intracranial abnormality. Diffuse atrophic changes with findings suggesting chronic microvascular   ischemia         CT Cervical Spine WO Contrast   Final Result   No acute abnormality of the cervical spine. CT Lumbar Spine WO Contrast   Final Result   1. No acute lumbar fracture. 2. Grade 1 degenerative anterolisthesis L4 on L5.   3. Bones appear osteoporotic. 4. Mild-to-moderate degenerative changes of the lumbar spine commensurate   with the patient's age. There is resultant mild lumbar spinal canal stenosis   L4-5. 5. Prominent calcific atherosclerosis distal aorta with narrowing of the   lumen, incompletely characterized without IV contrast enhancement.          FL MODIFIED BARIUM SWALLOW W VIDEO    (Results Pending)         Assessment/Plan:    Active Hospital Problems    Diagnosis    Ischemic stroke [facility]) [I63.9]    Fall against object [W18.00XA]     Acute right thalamus ischemic infarct  - sx: Left lower extremity weakness/hemiplegia  -Occurred after a fall at the nursing home  -MRI +  - MRA + for P2 and P3 segment stenosis, no intervention  -Neurology following, d/w Harvey NP today  -continue on aspirin and statin  -Echo unrevealing but without bubble study   -PT and OT recommending outpatient therapy  -Speech therapy concerned for aspiration, MBS ordered    S/p fall at nursing home  -PT and OT to evaluate, recommending therapy after discharge    Hyperlipidemia  -Elevated  -continue statin    History of hemorrhagic stroke in 2011  -Gait disturbances    ? CAD  - no formal documentation noted      DVT Prophylaxis: Lovenox  Diet: DIET DYSPHAGIA MINCED AND MOIST;  Dysphagia Minced and Moist; Mildly Thick (Nectar)  Code Status: Full Code    PT/OT Eval Status: ordered    Dispo - inpatient    Abel Samples, APRN - CNP

## 2020-12-08 NOTE — CARE COORDINATION
SNF Bed is available at [facility] for Her DC needs.   Kajal Red [place]     Case Management   [phone]    12/8/2020  5:05 PM

## 2020-12-08 NOTE — PROGRESS NOTES
Pt resting quietly. Shift assessment completed. Medications administered. Purwick placed at HS per pt request.  No issues noted. Will continue to monitor.

## 2020-12-08 NOTE — PROCEDURES
[facility]   Speech Therapy   MODIFIED BARIUM SWALLOW      Delaney Johnson  AGE: 80 y.o. GENDER: female  : 3/18/1931  [id number]  EPISODE DATE:  2020  Ordering MD:  Ordering Physician: Leela Jonas NP  Radiologist:  Radiologist: Dr Berline Krabbe  Date of Eval:  2020     Type of Study: Modified Barium Swallow    ONSET DATE: 2020       MEDICAL DIAGNOSIS: Dysphagia  TREATMENT DIAGNOSIS: Oropharyngeal Dysphagia    PAST MEDICAL HISTORY   has a past medical history of CAD (coronary artery disease) and Cerebral artery occlusion with cerebral infarction ([facility] [address] PAST SURGICAL HISTORY  Past Surgical History:   Procedure Laterality Date    HYSTERECTOMY         ALLERGIES  Allergies   Allergen Reactions    Sulfa Antibiotics     Trazodone     Sulfamethoxazole-Trimethoprim Nausea Only and Rash     Chart review:   HPI per neuro:  Reyna Peralta a 80 y.o. female who presents with a fall. The patient lives at [facility]. She carries a diagnosis of gait apraxia and uses a walker.  Does have a documented hx of both dizziness/vertigo as well.  Also w/ old lacunar strokes (?hemorrhagic stroke)), though not sure of any chronic focal neurologic deficit. She says yesterday morning she felt fine.  Around 1100 she was at her desk and when she stood up she essentially just collapsed to the ground.  No reported symptoms preceding the event.  No LOC. She says that she could not get herself up and felt like everything below the waist was not working. Nisha Jimenez called staff at [facility] who then called EMS in order to have her transported to the ED to be evaluated. Initial BP was 162/83.  No fever.  CT head was w/out any acute findings.  No acute fracture on CT cervical/lumbar spine as she was complaining of back pain.  No focal neurologic deficits were noted in the ED.  Apparently she could not stand up.  Currently, she clearly has L sided deficits.  Highly suspicious for acute stroke   2020 MRI Brain:  Impression    Acute infarction in the right thalamus.         Old infarctions in the bilateral basal ganglia and bilateral thalami.         Curvilinear susceptibility artifacts in the right frontal lobe and bilateral    basal ganglia, likely related to sequela of old hemorrhage.  Scattered tiny    foci of susceptibility artifacts, likely related to old petechial hemorrhage    or small cavernomas versus amyloid angiopathy.         Mild to moderate parenchymal volume loss.         Mild to moderate chronic microvascular disease.        Subjective:     Reason for referral: Uzma Lopez was referred for a Modified Barium Swallow study to assess  the efficiency of swallow function, rule out aspiration and make recommendations regarding safe dietary consistencies, effective compensatory strategies, and safe eating environment. PATIENT AND FAMILY / STAFF COMPLAINTS:   Coughing intermittently with liquids; new onset CVA   Diet prior to study:   Current Diet Solid Consistency: Dysphagia Minced and Moist (Dysphagia II)  Current Diet Liquid Consistency: Mildly Thick (Nectar)    Objective: Impressions and Recommendations     IMPRESSIONS:    1. Behavior/Cognition: Alert, Cooperative, required cueing. Very Sax[place] but able to read and comprehend short sentences. 2.Dysphagia Diagnosis:  Mild to moderate oral and mild pharyngeal phase dysphagia characterized by prolonged incomplete anterior mastication, piecemeal swallow, reduced lingual coordination and AP transit, reduced BOT retraction, delayed swallow initiation, decreased laryngeal elevation, and decreased pharyngeal wall movement.     · Shallow and deep penetration with moderately honey thick liquid  · Inconsistent shallow penetration with mildly nectar thick and thin liquid via cup and straw; fully clears laryngeal vestibule; NO ASPIRATION  · Episodic premature bolus loss with mince/moist  · Oral and tongue base residue with all; vallecular residue with puree and all liquids, compounded by spillage from oral cavity after initial swallow  · Minimal posterior wall residue with puree and honey thick liquid    3. Analysis of compensatory strategies: chin tuck not consistently effective in eliminating penetration     Dysphagia Score: Dysphagia Outcome Severity Scale: Level 4: Mild moderate dysphagia- Intermittent supervision/cueing. One - two diet consistencies restricted    Aspiration/Penetration Risk:   Penetration-Aspiration Scale (PAS): 4 - Material enters the airway, contacts the vocal folds, and is ejected from the airway    Diet Recommendations:  Solid consistency: Dysphagia Minced and Moist (Dysphagia II)   Liquid consistency: Thin   Medication administration: Meds in puree     Compensatory Swallow Strategies:   Compensatory Swallowing Strategies: Upright as possible for all oral intake, Remain upright for 30-45 minutes after meals, Eat/Feed slowly, Assist feed, Small bites/sips, Swallow 2 times per bite/sip    Therapy:  Requires SLP Intervention: Yes  Duration/Frequency of Treatment: 3-5x/wk while on acute unit    Therapy Interventions:   Therapeutic Interventions: Diet tolerance monitoring, Therapeutic PO trials with SLP, Patient/Family education, Oral motor exercises, Bolus control exercises, Pharyngeal exercises, Laryngeal exercises    Goals:   Dysphagia Goals: The patient will tolerate recommended diet without observed clinical signs of aspiration, The patient will improve oral motor function via therapeutic oral motor exercises to 5/5 each trial., The patient/caregiver will demonstrate understanding of compensatory strategies for improved swallowing safety. , The patient will tolerate mechanical soft foods 10/10. (The pt will complete [place]/PEX 5/5)    Prognosis:  Prognosis for safe diet advancement: good  Barriers to reach goals: age  Consulted and agree with results and recommendations: Patient, RN    Education:  Consulted and agree with results and recommendations: Patient, RN  Patient Education: results, recommendations  Patient Education Response: Verbalizes understanding, Needs reinforcement    D/C Recommendations: D/C Recommendations: (continued dysphagia tx upon DC)   Supervision: Close    Assessment: Test Data   General  Cognitive/Behavior  Behavior/Cognition  Behavior/Cognition: Alert; Cooperative;Confused    Vision/Hearing  Hearing  Hearing: Exceptions to WFL(Pt reports she wears hearing aids, but they are at [facility])  Hearing Exceptions: Hard of hearing/hearing concerns    Consistencies Assessed    Consistencies Administered: Dysphagia Soft and Bite-Sized (Dysphagia III), Reg solid, Dysphagia Minced and Moist (Dysphagia II), Dysphagia Pureed (Dysphagia I), Honey cup, Nectar cup, Nectar straw, Thin cup, Thin straw    Positioning   Upright in chair, lateral    Indicators of Oral Phase Dysfunction   Oral Preparation / Oral Phase  Oral Phase: Impaired  Oral Phase - Major Contributing Deficits  Poor Mastication: Reg solid, Soft solid(prolonged anterior)  Reduced Posterior Propulsion: All  Decreased Bolus Cohesion: All  Lingual / Palatal Residue: All  Piecemeal Swallowing: All  Premature Bolus Loss to Pharynx: Mechanical soft solid  Reduced Tongue Base Retraction: All    Indicators of Pharyngeal Phase Dysfunction  Pharyngeal Phase  Pharyngeal Phase: Impaired  Pharyngeal Phase: Impaired  Pharyngeal Phase - Major Contributing Deficits  Delayed Swallow Initiation: All  Premature Spillage to Valleculae: Mechanical soft solid  Reduced Laryngeal Elevation: All  Pooling Valleculae: Mechanical soft solid  Shallow Penetration During: Honey cup;Nectar cup; Thin cup; Thin straw  Deep Penetration During: Honey cup  Pharyngeal Residue - Valleculae: Puree; Honey cup;Nectar cup; Thin cup; Thin straw  Pharyngeal Residue - Posterior Pharynx: Puree; Honey cup  Pharyngeal Wall - Weakness:  All    Upper Esophageal Phase   DNT    MINUTES/CHARGES  Time In: 1430  SLP Individual Minutes  Time In: 1430  Time Out: 1530  Minutes: 60  Coded treatment time  5     Electronically signed by  Kimo Osorio MS, CCC-SLP #[id number]  Speech Language Pathologist   on 12/8/2020 at 3:39PM

## 2020-12-09 ENCOUNTER — APPOINTMENT (OUTPATIENT)
Dept: GENERAL RADIOLOGY | Age: 85
DRG: 065 | End: 2020-12-09
Payer: MEDICARE

## 2020-12-09 VITALS
TEMPERATURE: 97.9 F | WEIGHT: 120.37 LBS | SYSTOLIC BLOOD PRESSURE: 149 MMHG | RESPIRATION RATE: 16 BRPM | OXYGEN SATURATION: 93 % | DIASTOLIC BLOOD PRESSURE: 74 MMHG | HEART RATE: 94 BPM | BODY MASS INDEX: 22.02 KG/M2

## 2020-12-09 LAB
ANION GAP SERPL CALCULATED.3IONS-SCNC: 13 MMOL/L (ref 3–16)
BUN BLDV-MCNC: 18 MG/DL (ref 7–20)
CALCIUM SERPL-MCNC: 9.2 MG/DL (ref 8.3–10.6)
CHLORIDE BLD-SCNC: 101 MMOL/L (ref 99–110)
CO2: 23 MMOL/L (ref 21–32)
CREAT SERPL-MCNC: 0.5 MG/DL (ref 0.6–1.2)
GFR AFRICAN AMERICAN: >60
GFR NON-AFRICAN AMERICAN: >60
GLUCOSE BLD-MCNC: 110 MG/DL (ref 70–99)
HCT VFR BLD CALC: 45.3 % (ref 36–48)
HEMOGLOBIN: 15.3 G/DL (ref 12–16)
MCH RBC QN AUTO: 29.2 PG (ref 26–34)
MCHC RBC AUTO-ENTMCNC: 33.8 G/DL (ref 31–36)
MCV RBC AUTO: 86.5 FL (ref 80–100)
PDW BLD-RTO: 14.5 % (ref 12.4–15.4)
PLATELET # BLD: 143 K/UL (ref 135–450)
PMV BLD AUTO: 8.8 FL (ref 5–10.5)
POTASSIUM REFLEX MAGNESIUM: 4 MMOL/L (ref 3.5–5.1)
RBC # BLD: 5.24 M/UL (ref 4–5.2)
SARS-COV-2, NAAT: NOT DETECTED
SODIUM BLD-SCNC: 137 MMOL/L (ref 136–145)
WBC # BLD: 8.5 K/UL (ref 4–11)

## 2020-12-09 PROCEDURE — 6370000000 HC RX 637 (ALT 250 FOR IP): Performed by: INTERNAL MEDICINE

## 2020-12-09 PROCEDURE — 80048 BASIC METABOLIC PNL TOTAL CA: CPT

## 2020-12-09 PROCEDURE — 94760 N-INVAS EAR/PLS OXIMETRY 1: CPT

## 2020-12-09 PROCEDURE — 85027 COMPLETE CBC AUTOMATED: CPT

## 2020-12-09 PROCEDURE — 36415 COLL VENOUS BLD VENIPUNCTURE: CPT

## 2020-12-09 PROCEDURE — 97129 THER IVNTJ 1ST 15 MIN: CPT

## 2020-12-09 PROCEDURE — U0002 COVID-19 LAB TEST NON-CDC: HCPCS

## 2020-12-09 PROCEDURE — 92526 ORAL FUNCTION THERAPY: CPT

## 2020-12-09 PROCEDURE — 6370000000 HC RX 637 (ALT 250 FOR IP): Performed by: NURSE PRACTITIONER

## 2020-12-09 PROCEDURE — 73110 X-RAY EXAM OF WRIST: CPT

## 2020-12-09 PROCEDURE — 2580000003 HC RX 258: Performed by: NURSE PRACTITIONER

## 2020-12-09 RX ORDER — ASPIRIN 81 MG/1
81 TABLET ORAL DAILY
Qty: 30 TABLET | Refills: 3 | Status: SHIPPED | OUTPATIENT
Start: 2020-12-10

## 2020-12-09 RX ORDER — DOXYCYCLINE HYCLATE 100 MG
100 TABLET ORAL 2 TIMES DAILY
Qty: 14 TABLET | Refills: 0 | Status: SHIPPED | OUTPATIENT
Start: 2020-12-09 | End: 2020-12-16

## 2020-12-09 RX ORDER — ATORVASTATIN CALCIUM 80 MG/1
80 TABLET, FILM COATED ORAL NIGHTLY
Qty: 30 TABLET | Refills: 3 | Status: SHIPPED | OUTPATIENT
Start: 2020-12-09

## 2020-12-09 RX ORDER — DOXYCYCLINE HYCLATE 100 MG
100 TABLET ORAL ONCE
Status: COMPLETED | OUTPATIENT
Start: 2020-12-09 | End: 2020-12-09

## 2020-12-09 RX ADMIN — Medication 1 CAPSULE: at 08:39

## 2020-12-09 RX ADMIN — DOXYCYCLINE HYCLATE 100 MG: 100 TABLET, COATED ORAL at 12:17

## 2020-12-09 RX ADMIN — SODIUM CHLORIDE, PRESERVATIVE FREE 10 ML: 5 INJECTION INTRAVENOUS at 08:42

## 2020-12-09 RX ADMIN — PSYLLIUM HUSK 1 PACKET: 3.4 GRANULE ORAL at 08:40

## 2020-12-09 RX ADMIN — AMLODIPINE BESYLATE 5 MG: 5 TABLET ORAL at 08:39

## 2020-12-09 RX ADMIN — LEVOTHYROXINE SODIUM 25 MCG: 0.03 TABLET ORAL at 05:22

## 2020-12-09 RX ADMIN — ASPIRIN 81 MG: 81 TABLET, COATED ORAL at 08:40

## 2020-12-09 NOTE — CARE COORDINATION
SOCIAL WORK DISCHARGE SUMMARY:      DISCHARGE DATE:                 [facility] 12-9-2020      DISCHARGE PLACE:                [place]                REPORT NUMBER:       [id number]             FAX NUMBER:                [phone]        TRANSPORTATION:                [facility]  [address] [place] [address]             TIME:                                3:30 PM        INSURANCE PRECERT OBTAINED:       hUMANA APPROVED FROM EVELYN MELÉNDEZ/RACHEL COMPLETED:                 =YES    covid test:    PENDING AT THIS TIME.     [facility]     Case Management   [phone]    12/9/2020  12:21 PM

## 2020-12-09 NOTE — CARE COORDINATION
MISTYW reviewed chart. LSW rec'd dc order. LSW called pt's son to inform of DC for today with recommendation for SNF. His plan is that she return to [facility]. HE is interested in hearing from the MD about what was found during her stay. Call placed to [facility]  [phone] who approved of return today as long as she has a [address] completed hens as she is from [address] [place] [address].   [place], [place]     Case Management   [phone]    12/9/2020  12:08 PM

## 2020-12-09 NOTE — PLAN OF CARE
Problem: Falls - Risk of:  Goal: Will remain free from falls  Description: Will remain free from falls  Outcome: Ongoing  Note: Fall risk assessment completed as charted. Pt is at risk for falls. Safety precautions in place. Call light and pt belongings in reach. Bed in low position. Bed/Chair Alarm on. Nonskid footwear on. All needs met. Pt instructed to call before getting up or out of bed. Pt verbalized understanding. Problem: Pain:  Goal: Patient's pain/discomfort is manageable  Description: Patient's pain/discomfort is manageable  Outcome: Ongoing  Note: Pt assessed for pain this shift. Pt pain/discomfort is managed with PRN pain medications per md order. Pt able to verbalize pain by using numerical scale. Education provided and documented. Problem: Skin Integrity:  Goal: Skin integrity will stabilize  Description: Skin integrity will stabilize  Outcome: Ongoing  Note: Pt assessed for risk of skin breakdown. Encouraged pt to turn and reposition self while in bed. Skin clean and dry. No new skin breakdown noted. Heels floating. Will continue to assess skin condition each shift.
Problem: Falls - Risk of:  Goal: Will remain free from falls  Description: Will remain free from falls  Outcome: Ongoing  Note: Uses call light very well. Problem: Infection:  Goal: Will remain free from infection  Description: Will remain free from infection  Outcome: Ongoing  Note: No issues noted     Problem: Daily Care:  Goal: Daily care needs are met  Description: Daily care needs are met  Note: Assist for daily care, decrease use of left hand. Problem: Skin Integrity:  Goal: Will show no infection signs and symptoms  Description: Will show no infection signs and symptoms  Note: Using pur wick, no skin issues noted.
rate pain appropriately using numerical scale.         Problem: Skin Integrity:  Goal: Skin integrity will stabilize  Description: Skin integrity will stabilize  Outcome: Ongoing     Problem: Discharge Planning:  Goal: Patients continuum of care needs are met  Description: Patients continuum of care needs are met  Outcome: Ongoing

## 2020-12-09 NOTE — PROGRESS NOTES
Pt has been resting in bed without complaints this shift. Purwick in place. Pt given PRN melatonin and tylenol per request for sleep. No other needs voiced. Will continue to monitor.

## 2020-12-09 NOTE — DISCHARGE SUMMARY
Hospitalist Discharge Summary    Patient ID:  Aliza Singleton  [id number]  75 y.o.  3/18/1931    Admit date: 12/5/2020    Discharge date: 12/9/2020    Disposition: SNF    Admission Diagnoses:   Patient Active Problem List   Diagnosis    Benign paroxysmal positional vertigo    Chronic kidney disease, stage II (mild)    Coronary atherosclerosis    Hypertension    Hypothyroid    Insomnia    Vasovagal syncope    Vitamin D deficiency    Old myocardial infarction    Depression    Frailty syndrome in geriatric patient    Debility    Frequent UTI    Lumbar radiculopathy    Urinary incontinence    Fall against object    Ischemic stroke [facility])       Discharge Diagnoses: Active Problems:    Fall against object    Ischemic stroke [facility])  Resolved Problems:    * No resolved hospital problems. *      Code Status:  Full Code    Condition:  Stable    Discharge Diet: Diet:  DIET DYSPHAGIA MINCED AND MOIST; Dysphagia Minced and Moist    PCP to do list:  Routine follow up, neurology follow-up with Dr. Mancilla Memory:     Fall, acute CVA  Presented from nursing facility with left lower extremity weakness and fall. Work-up revealed acute infarct of the right thalamus and 80% focal stenosis of proximal P2 P3 of the left and right PCA which neurology did not think was related to her current stroke. Echo unremarkable though bubble study was not performed. Continue aspirin and statin and follow-up with neurology at discharge. L wrist swelling  Indurated area likely from IV placement but erythema noted on thumb as well. X-ray unremarkable. Discharging with doxy to cover cellulitis. CAD  No formal documentation. Continue home meds. Hyperlipidemia  Continue statin. Hypertension  Stable. Continue home meds.     Discharge Medications:   Current Discharge Medication List      START taking these medications    Details   aspirin 81 MG EC tablet Take 1 tablet by mouth daily  Qty: 30 tablet, Refills: 3      atorvastatin (LIPITOR) 80 MG tablet Take 1 tablet by mouth nightly  Qty: 30 tablet, Refills: 3      doxycycline hyclate (VIBRA-TABS) 100 MG tablet Take 1 tablet by mouth 2 times daily for 7 days  Qty: 14 tablet, Refills: 0           Current Discharge Medication List        Current Discharge Medication List      CONTINUE these medications which have NOT CHANGED    Details   levothyroxine (SYNTHROID) 25 MCG tablet TAKE 1 TABLET EVERY DAY  Qty: 90 tablet, Refills: 0      amLODIPine (NORVASC) 5 MG tablet Take 1 tablet by mouth daily  Qty: 30 tablet, Refills: 11      Psyllium (METAMUCIL) 28.3 % POWD Take 1 each by mouth as needed      Probiotic Product (PROBIOTIC-10 PO) Take 1 capsule by mouth daily as needed            Current Discharge Medication List          Procedures: None     Assessment on Discharge: Stable, improved     Discharge Exam:  BP (!) 162/82   Pulse 90   Temp 97.9 °F (36.6 °C) (Oral)   Resp 16   Wt 120 lb 5.9 oz (54.6 kg)   SpO2 95%   BMI 22.02 kg/m²     General appearance: No apparent distress, appears stated age and cooperative. HEENT: Pupils equal, round, and reactive to light. Conjunctivae/corneas clear. Neck: Supple, with full range of motion. Trachea midline. Respiratory:  Normal respiratory effort. Clear to auscultation, bilaterally without Rales/Wheezes/Rhonchi. Cardiovascular: Regular rate and rhythm with normal S1/S2 without murmurs, rubs or gallops. Abdomen: Soft, non-tender, non-distended with normal bowel sounds. Musculoskelatal: No clubbing, cyanosis or edema bilaterally. Full range of motion without deformity. Skin: Skin color, texture, turgor normal.  No rashes or lesions. Neurologic:  LLE 3+/5.  LUE4-/5, Cranial nerves: II-XII intact, grossly non-focal.  Psychiatric: Alert and oriented, thought content appropriate, normal insight    Pertinent Studies During Hospital Stay:    Radiology:  Ct Head Wo Contrast    Result Date: 12/5/2020  EXAMINATION: CT OF THE HEAD WITHOUT CONTRAST  12/5/2020 1:17 pm TECHNIQUE: CT of the head was performed without the administration of intravenous contrast. Dose modulation, iterative reconstruction, and/or weight based adjustment of the mA/kV was utilized to reduce the radiation dose to as low as reasonably achievable. COMPARISON: 08/29/2019 HISTORY: ORDERING SYSTEM PROVIDED HISTORY: fall TECHNOLOGIST PROVIDED HISTORY: Reason for exam:->fall Has a \"code stroke\" or \"stroke alert\" been called? ->No Reason for Exam: FALL, LOWER BACK PAIN, NECK PAIN Acuity: Acute Type of Exam: Initial FINDINGS: BRAIN/VENTRICLES: The ventricles and sulci are diffusely enlarged. Low attenuation is seen in the periventricular and subcortical white matter. No acute intracranial hemorrhage or acute infarct is identified. ORBITS: The visualized portion of the orbits demonstrate no acute abnormality. SINUSES: The visualized paranasal sinuses and mastoid air cells demonstrate no acute abnormality. SOFT TISSUES/SKULL:  No acute abnormality of the visualized skull or soft tissues. No acute intracranial abnormality. Diffuse atrophic changes with findings suggesting chronic microvascular ischemia     Ct Cervical Spine Wo Contrast    Result Date: 12/5/2020  EXAMINATION: CT OF THE CERVICAL SPINE WITHOUT CONTRAST 12/5/2020 1:17 pm TECHNIQUE: CT of the cervical spine was performed without the administration of intravenous contrast. Multiplanar reformatted images are provided for review. Dose modulation, iterative reconstruction, and/or weight based adjustment of the mA/kV was utilized to reduce the radiation dose to as low as reasonably achievable. COMPARISON: None. HISTORY: ORDERING SYSTEM PROVIDED HISTORY: fall, neck pain TECHNOLOGIST PROVIDED HISTORY: Reason for exam:->fall, neck pain Reason for Exam: FALL, LOWER BACK PAIN, NECK PAIN Acuity: Acute Type of Exam: Initial FINDINGS: BONES/ALIGNMENT: There is no acute fracture or traumatic malalignment.  DEGENERATIVE CHANGES: Multilevel degenerative changes. SOFT TISSUES: There is no prevertebral soft tissue swelling. No acute abnormality of the cervical spine. Ct Lumbar Spine Wo Contrast    Result Date: 12/5/2020  EXAMINATION: CT OF THE LUMBAR SPINE WITHOUT CONTRAST  12/5/2020 TECHNIQUE: CT of the lumbar spine was performed without the administration of intravenous contrast. Multiplanar reformatted images are provided for review. Dose modulation, iterative reconstruction, and/or weight based adjustment of the mA/kV was utilized to reduce the radiation dose to as low as reasonably achievable. COMPARISON: None HISTORY: ORDERING SYSTEM PROVIDED HISTORY: fall, LBP TECHNOLOGIST PROVIDED HISTORY: Reason for exam:->fall, LBP Reason for Exam: FALL, LOWER BACK PAIN, NECK PAIN Acuity: Acute Type of Exam: Initial FINDINGS: BONES/ALIGNMENT: Bone mineralization appears abnormally low. Grade 1 degenerative anterolisthesis L4 on L5 with otherwise normal alignment of the spine. The vertebral body heights are maintained. No osseous destructive lesion is seen. DEGENERATIVE CHANGES: Diffuse mild-to-moderate degenerative changes of the lumbar spine. Posterior disc bulge and ligamentum flavum and facet hypertrophic change L4-5 result in mild canal stenosis measuring 9 mm AP dimension. SOFT TISSUES/RETROPERITONEUM: No paraspinal mass is seen. Prominent calcific atherosclerosis distal aorta with narrowing of the lumen, incompletely characterized without IV contrast enhancement. 1. No acute lumbar fracture. 2. Grade 1 degenerative anterolisthesis L4 on L5. 3. Bones appear osteoporotic. 4. Mild-to-moderate degenerative changes of the lumbar spine commensurate with the patient's age. There is resultant mild lumbar spinal canal stenosis L4-5. 5. Prominent calcific atherosclerosis distal aorta with narrowing of the lumen, incompletely characterized without IV contrast enhancement.      Mra Head Wo Contrast    Result Date: Exam: weakness Acuity: Acute Type of Exam: Initial FINDINGS: Patient is rotated to the left. Heart size indeterminate. Lungs clear. Costophrenic angles sharp     No active cardiopulmonary disease     Mra Neck W Wo Contrast    Result Date: 12/8/2020  EXAMINATION: MRA OF THE HEAD WITHOUT CONTRAST; MRA OF THE NECK WITH AND WITHOUT CONTRAST 12/8/2020 7:37 am TECHNIQUE: MRA of the head was performed utilizing time-of-flight imaging with MIP images. No intravenous contrast was administered.; Multiplanar multisequence MRA of the neck was performed with and without the administration of intravenous contrast. Stenosis of the internal carotid arteries measured using NASCET criteria. Maximum intensity projection images were reviewed. COMPARISON: None HISTORY: ORDERING SYSTEM PROVIDED HISTORY: stroke TECHNOLOGIST PROVIDED HISTORY: Reason for exam:->stroke Reason for Exam: stroke Acuity: Acute Type of Exam: Subsequent/Follow-up FINDINGS: MRA HEAD: ANTERIOR CIRCULATION: The internal carotid arteries are normal in course and caliber without focal stenosis. The anterior cerebral and middle cerebral arteries demonstrate no focal stenosis. POSTERIOR CIRCULATION: There is 80% focal stenosis in the proximal P2 segment of the left PCA. There is 80% stenosis in the P3 segment of the right PCA. The vertebral and basilar arteries appear unremarkable. No intracranial aneurysm. MRA neck: Common and internal carotid arteries: The bilateral common carotid arteries are within normal limits without flow limiting stenosis. The bilateral internal carotid arteries are within normal limits without flow limiting stenosis by NASCET criteria. There is no dissection or vascular injury. Vertebral arteries: The bilateral vertebral arteries are within normal limits without focal stenosis or dissection. 80% focal stenosis in the proximal P2 segment of the left PCA. 80% stenosis in the P3 segment of the right PCA. Unremarkable MRA of the neck. Mri Brain Wo Contrast    Result Date: 12/8/2020  EXAMINATION: MRI OF THE BRAIN WITHOUT CONTRAST  12/8/2020 7:36 am TECHNIQUE: Multiplanar multisequence MRI of the brain was performed without the administration of intravenous contrast. COMPARISON: CT head December 5, 2020 HISTORY: ORDERING SYSTEM PROVIDED HISTORY: r/o stroke TECHNOLOGIST PROVIDED HISTORY: Reason for exam:->r/o stroke Reason for Exam: r/o stroke Acuity: Acute Type of Exam: Subsequent/Follow-up FINDINGS: INTRACRANIAL STRUCTURES/VENTRICLES: There is acute infarction in the right thalamus. There are old infarctions in the bilateral basal ganglia and bilateral thalami. There are curvilinear susceptibility artifacts in the right frontal lobe and bilateral basal ganglia, likely related to sequela of old hemorrhage. There are scattered tiny foci susceptibility artifacts, likely related to old petechial hemorrhage or small cavernomas versus amyloid angiopathy. There is mild to moderate parenchymal volume loss. There is T2/FLAIR hyperintensity in the periventricular and subcortical white matter, likely related to mild to moderate chronic microvascular disease. No mass effect or midline shift. No evidence of an acute intracranial hemorrhage. There is no evidence of hydrocephalus. The sellar/suprasellar regions appear unremarkable. The normal signal voids within the major intracranial vessels appear maintained. ORBITS: The visualized portion of the orbits demonstrate no acute abnormality. SINUSES: There is scattered mild mucosal thickening in the paranasal sinuses. The bilateral mastoid air cells are well aerated. BONES/SOFT TISSUES: The bone marrow signal intensity appears normal. The soft tissues demonstrate no acute abnormality. Acute infarction in the right thalamus. Old infarctions in the bilateral basal ganglia and bilateral thalami.  Curvilinear susceptibility artifacts in the right frontal lobe and bilateral basal ganglia, likely related to

## 2020-12-09 NOTE — CARE COORDINATION
MISTYW spoke with [facility] at [address] [facility] who reports they can see her on Thursday, 12-10 at 3pm  And [facility] [address] [facility]     [facility]   [phone]    12/9/2020  5:02 PM

## 2020-12-09 NOTE — PROGRESS NOTES
Patient agreed to discharge. Late Entry: on 12/09/2020 @1635 -  Patient in room for discharge instructions, all teaching performed and questions answered with verbal acknowledgement received, papers from soft chart and prescriptions given to EMT transporters who transported patient via stretcher to ambulance and discharged to nursing home with all documented belongings.  Electronically signed by Oriana Mendez RN on 12/9/2020 at 5:07 PM

## 2020-12-09 NOTE — PROGRESS NOTES
[facility]   Speech Therapy  Daily Dysphagia Treatment Note    Brian Post  AGE: 80 y.o. GENDER: female  : 3/18/1931  [id number]  EPISODE DATE:  2020     Patient Active Problem List   Diagnosis    Benign paroxysmal positional vertigo    Chronic kidney disease, stage II (mild)    Coronary atherosclerosis    Hypertension    Hypothyroid    Insomnia    Vasovagal syncope    Vitamin D deficiency    Old myocardial infarction    Depression    Frailty syndrome in geriatric patient    Debility    Frequent UTI    Lumbar radiculopathy    Urinary incontinence    Fall against object    Ischemic stroke (HCC)     Allergies   Allergen Reactions    Sulfa Antibiotics     Trazodone     Sulfamethoxazole-Trimethoprim Nausea Only and Rash     Treatment Diagnosis: Dysphagia       Chart review:   HPI per neuro: Stephani Pepe a 80 y.o. female who presents with a fall. The patient lives at [facility]. She carries a diagnosis of gait apraxia and uses a walker.  Does have a documented hx of both dizziness/vertigo as well.  Also w/ old lacunar strokes (?hemorrhagic stroke)), though not sure of any chronic focal neurologic deficit. She says yesterday morning she felt fine.  Around 1100 she was at her desk and when she stood up she essentially just collapsed to the ground.  No reported symptoms preceding the event.  No LOC. She says that she could not get herself up and felt like everything below the waist was not working. Rodo Naranjo called staff at [facility] who then called EMS in order to have her transported to the ED to be evaluated. Initial BP was 162/83.  No fever.  CT head was w/out any acute findings.  No acute fracture on CT cervical/lumbar spine as she was complaining of back pain.  No focal neurologic deficits were noted in the ED.  Apparently she could not stand up.  Currently, she clearly has L sided deficits.  Highly suspicious for acute stroke     2020 MRI Brain:  Impression Acute infarction in the right thalamus.         Old infarctions in the bilateral basal ganglia and bilateral thalami.         Curvilinear susceptibility artifacts in the right frontal lobe and bilateral    basal ganglia, likely related to sequela of old hemorrhage.  Scattered tiny    foci of susceptibility artifacts, likely related to old petechial hemorrhage    or small cavernomas versus amyloid angiopathy.         Mild to moderate parenchymal volume loss.         Mild to moderate chronic microvascular disease. 12/8/2020 most recent MBS:  Mild to moderate oral and mild pharyngeal phase dysphagia characterized by prolonged incomplete anterior mastication, piecemeal swallow, reduced lingual coordination and AP transit, reduced BOT retraction, delayed swallow initiation, decreased laryngeal elevation, and decreased pharyngeal wall movement. · Shallow and deep penetration with moderately honey thick liquid  · Inconsistent shallow penetration with mildly nectar thick and thin liquid via cup and straw; fully clears laryngeal vestibule; NO ASPIRATION  · Episodic premature bolus loss with mince/moist  · Oral and tongue base residue with all; vallecular residue with puree and all liquids, compounded by spillage from oral cavity after initial swallow  · Minimal posterior wall residue with puree and honey thick liquid  Analysis of compensatory strategies: chin tuck not consistently effective in eliminating penetration   Diet Recommendations:  · Solid consistency: Dysphagia Minced and Moist (Dysphagia II)   · Liquid consistency:  Thin   · Medication administration: Meds in puree   Compensatory Swallowing Strategies:   · Upright as possible for all oral intake,   · Remain upright for 30-45 minutes after meals,   · Eat/Feed slowly,   · Assist feed,   · Small bites/sips,   · Swallow 2 times per bite/sip     Subjective:     Current diet  Dysphagia minced and moist  Thin liquids    Comments regarding tolerating Current further assessment  Pt alert, cooperative but requires repeated cues d/t hard of hearing; follows simple commands and is oriented x3.     · Suspected moderate oropharyngeal dysphagia characterized by left sided labial and lingual weakness, and decreased lingual coordination affecting bolus formation and control, decreased AP transit with high suspicion for premature bolus loss to pharynx, delayed swallow initiation, and decreased laryngeal elevation. · Variable minimal to mild oral residue with puree and minced/moist texture, with variable pt awareness to clear. Residue is cleared with liquid wash. Diet Recommendations:  Minced/moist  Thin  Recommended Form of Meds: Meds in puree  Compensatory Swallowing Strategies: Upright as possible for all oral intake, Remain upright for 30-45 minutes after meals, Eat/Feed slowly, Assist feed, Small bites/sips, Swallow 2 times per bite/sip    Education:  Consulted and agree with results and recommendations: Patient, RN  Patient Education: results, recommendations  Patient Education Response: Verbalizes understanding, Needs reinforcement    Prognosis:   Good for dysphagia    Plan:     Continue Dysphagia Therapy: yes    Interventions: Therapeutic Interventions: Diet tolerance monitoring, Therapeutic PO trials with SLP, Patient/Family education, Oral motor exercises, Bolus control exercises, Pharyngeal exercises, Laryngeal exercises  Duration/Frequency of therapy while on unit: Duration/Frequency of Treatment  Duration/Frequency of Treatment: 3-5x/wk while on acute unit     Discharge Instructions:   Anticipate Yes for further skilled Speech Therapy for Dysphagia at discharge    This note serves as a D/C Summary in the event that this patient is discharged prior to the next therapy session. Coded treatment time:10  Total treatment time: 30    Electronically signed by  Kamran Sun.  Jatin Small, #[id number]  Speech-Language Pathologist  on 12/9/2020 at 12:47 PM

## 2021-03-30 ENCOUNTER — TELEPHONE (OUTPATIENT)
Dept: INTERNAL MEDICINE CLINIC | Age: 86
End: 2021-03-30